# Patient Record
Sex: FEMALE | Race: WHITE | HISPANIC OR LATINO | Employment: FULL TIME
[De-identification: names, ages, dates, MRNs, and addresses within clinical notes are randomized per-mention and may not be internally consistent; named-entity substitution may affect disease eponyms.]

---

## 2017-01-19 ENCOUNTER — CHARTING TRANS (OUTPATIENT)
Dept: OTHER | Age: 37
End: 2017-01-19

## 2017-01-19 ENCOUNTER — LAB SERVICES (OUTPATIENT)
Dept: OTHER | Age: 37
End: 2017-01-19

## 2017-01-24 ENCOUNTER — CHARTING TRANS (OUTPATIENT)
Dept: OTHER | Age: 37
End: 2017-01-24

## 2017-01-24 LAB
C TRACH RRNA SPEC QL NAA+PROBE: NEGATIVE
CANDIDA RRNA VAG QL PROBE: NORMAL
G VAGINALIS RRNA GENITAL QL PROBE: NORMAL
HAV IGM SER QL: NEGATIVE
HBV CORE IGM SER QL: NEGATIVE
HBV SURFACE AG SER QL: NEGATIVE
HCV AB SER QL: NEGATIVE
HIV 1+2 AB+HIV1 P24 AG SERPL QL IA: NONREACTIVE
N GONORRHOEA RRNA SPEC QL NAA+PROBE: NEGATIVE
RPR SER QL: NONREACTIVE
SERVICE CMNT-IMP: NORMAL
SPECIMEN SOURCE: NORMAL
SPECIMEN SOURCE: NORMAL
T VAGINALIS RRNA GENITAL QL PROBE: NORMAL
T VAGINALIS RRNA SPEC QL NAA+PROBE: NEGATIVE

## 2017-02-19 LAB
ABSOLUTE BASOPHILS: 37 CELLS/UL (ref 0–200)
ABSOLUTE EOSINOPHILS: 33 CELLS/UL (ref 15–500)
ABSOLUTE LYMPHOCYTES: 1123 CELLS/UL (ref 850–3900)
ABSOLUTE MONOCYTES: 324 CELLS/UL (ref 200–950)
ABSOLUTE NEUTROPHILS: 2583 CELLS/UL (ref 1500–7800)
ALBUMIN/GLOBULIN RATIO: 1.4 (CALC) (ref 1–2.5)
ALBUMIN: 4.1 G/DL (ref 3.6–5.1)
ALKALINE PHOSPHATASE: 34 U/L (ref 33–115)
ALT: 9 U/L (ref 6–29)
AST: 12 U/L (ref 10–30)
BASOPHILS: 0.9 %
BILIRUBIN, TOTAL: 0.8 MG/DL (ref 0.2–1.2)
BUN: 8 MG/DL (ref 7–25)
CALCIUM: 9 MG/DL (ref 8.6–10.2)
CARBON DIOXIDE: 25 MMOL/L (ref 20–31)
CHLORIDE: 104 MMOL/L (ref 98–110)
CREATININE: 0.71 MG/DL (ref 0.5–1.1)
EGFR IF AFRICN AM: 126 ML/MIN/1.73M2
EGFR IF NONAFRICN AM: 109 ML/MIN/1.73M2
EOSINOPHILS: 0.8 %
GLOBULIN: 2.9 G/DL (CALC) (ref 1.9–3.7)
GLUCOSE: 77 MG/DL (ref 65–99)
HEMATOCRIT: 38.4 % (ref 35–45)
HEMOGLOBIN: 13.1 G/DL (ref 11.7–15.5)
LYMPHOCYTES: 27.4 %
MCH: 31.4 PG (ref 27–33)
MCHC: 34.1 G/DL (ref 32–36)
MCV: 92 FL (ref 80–100)
MONOCYTES: 7.9 %
MPV: 8.9 FL (ref 7.5–12.5)
NEUTROPHILS: 63 %
PLATELET COUNT: 257 THOUSAND/UL (ref 140–400)
POTASSIUM: 4.2 MMOL/L (ref 3.5–5.3)
PROTEIN, TOTAL: 7 G/DL (ref 6.1–8.1)
RDW: 13.2 % (ref 11–15)
RED BLOOD CELL COUNT: 4.17 MILLION/UL (ref 3.8–5.1)
SODIUM: 138 MMOL/L (ref 135–146)
WHITE BLOOD CELL COUNT: 4.1 THOUSAND/UL (ref 3.8–10.8)

## 2017-03-10 ENCOUNTER — TELEPHONE (OUTPATIENT)
Dept: NEUROLOGY | Facility: CLINIC | Age: 37
End: 2017-03-10

## 2017-03-10 NOTE — TELEPHONE ENCOUNTER
Notes Recorded by Kike Barahona MD on 2/22/2017 at 9:18 AM  Lab good    Relayed above, verbalized understanding. No further questions.

## 2017-05-30 ENCOUNTER — TELEPHONE (OUTPATIENT)
Dept: NEUROLOGY | Facility: CLINIC | Age: 37
End: 2017-05-30

## 2017-05-30 DIAGNOSIS — G35 MULTIPLE SCLEROSIS (HCC): Primary | ICD-10-CM

## 2017-06-01 ENCOUNTER — TELEPHONE (OUTPATIENT)
Dept: NEUROLOGY | Facility: CLINIC | Age: 37
End: 2017-06-01

## 2017-06-01 NOTE — TELEPHONE ENCOUNTER
Approval received from [facility] for Tecfidera     Approval #[id number] PN from 5/30/17 - 05/30/2019

## 2017-06-01 NOTE — TELEPHONE ENCOUNTER
Contacted [facility] pharmacy and notified them of the approval.  Spoke with Adonay Cooley, and she entered the approval into their system. She states that patient can call pharmacy directly if she needs to expedite processing of the Rx.     Spoke with nati

## 2017-06-23 ENCOUNTER — OFFICE VISIT (OUTPATIENT)
Dept: NEUROLOGY | Facility: CLINIC | Age: 37
End: 2017-06-23

## 2017-06-23 VITALS
WEIGHT: 169 LBS | SYSTOLIC BLOOD PRESSURE: 118 MMHG | DIASTOLIC BLOOD PRESSURE: 62 MMHG | RESPIRATION RATE: 18 BRPM | HEART RATE: 64 BPM | BODY MASS INDEX: 27 KG/M2

## 2017-06-23 DIAGNOSIS — R20.9 DISTURBANCE OF SKIN SENSATION: ICD-10-CM

## 2017-06-23 DIAGNOSIS — G35 MULTIPLE SCLEROSIS EXACERBATION (HCC): Primary | ICD-10-CM

## 2017-06-23 DIAGNOSIS — G35 MULTIPLE SCLEROSIS (HCC): ICD-10-CM

## 2017-06-23 PROCEDURE — 99214 OFFICE O/P EST MOD 30 MIN: CPT | Performed by: OTHER

## 2017-06-23 NOTE — PROGRESS NOTES
[facility] [place] [facility]  Neurology - Clinic Follow up  2017    Charo Jones Patient Status:  No patient class for patient encounter    1980 MRN [id number]   Location SYSTEMS:    General: denies any fever or chills. Respiratory: Denies: Difficulty Breathing, Chronic Cough and Wheezing. Cardiovascular: NO Chest Pain and Palpitations. Neurological:  See history; relevant items discussed in the history.   Psychiatric: (CPT=72156/98938)    (G35) Multiple sclerosis ([facility] [address]  Plan: CBC WITH DIFFERENTIAL WITH PLATELET, COMP         METABOLIC PANEL (14), STRATIFY JCV(TM) AB (WITH        INDEX) W/RFL INHIBITION    (R20.9) Disturbance of skin sensation        MS is stable,   cont

## 2017-06-23 NOTE — PATIENT INSTRUCTIONS
Refill policies:    • Allow 2-3 business days for refills; controlled substances may take longer.   • Contact your pharmacy at least 5 days prior to running out of medication and have them send an electronic request or submit request through the [facility] and the [facility] have a procedure or additional testing performed. [facility]) will contact your insurance carrier to obtain pre-certification or prior authorization.     Unfortunately, [facility] has seen an increase in denial of payment even though the p

## 2017-06-30 ENCOUNTER — TELEPHONE (OUTPATIENT)
Dept: SURGERY | Facility: CLINIC | Age: 37
End: 2017-06-30

## 2017-06-30 NOTE — TELEPHONE ENCOUNTER
Patient at [facility] for [address]. Only has order, does not have [facility] JCV form. Needing to have it faxed. Faxed form to number above. Confirmation received.

## 2017-07-01 LAB
ABSOLUTE BASOPHILS: 19 CELLS/UL (ref 0–200)
ABSOLUTE EOSINOPHILS: 70 CELLS/UL (ref 15–500)
ABSOLUTE LYMPHOCYTES: 1162 CELLS/UL (ref 850–3900)
ABSOLUTE MONOCYTES: 320 CELLS/UL (ref 200–950)
ABSOLUTE NEUTROPHILS: 1629 CELLS/UL (ref 1500–7800)
ALBUMIN/GLOBULIN RATIO: 1.4 (CALC) (ref 1–2.5)
ALBUMIN: 4 G/DL (ref 3.6–5.1)
ALKALINE PHOSPHATASE: 44 U/L (ref 33–115)
ALT: 13 U/L (ref 6–29)
AST: 13 U/L (ref 10–30)
BASOPHILS: 0.6 %
BILIRUBIN, TOTAL: 0.8 MG/DL (ref 0.2–1.2)
BUN: 8 MG/DL (ref 7–25)
CALCIUM: 9.1 MG/DL (ref 8.6–10.2)
CARBON DIOXIDE: 24 MMOL/L (ref 20–31)
CHLORIDE: 108 MMOL/L (ref 98–110)
CREATININE: 0.71 MG/DL (ref 0.5–1.1)
EGFR IF AFRICN AM: 126 ML/MIN/1.73M2
EGFR IF NONAFRICN AM: 109 ML/MIN/1.73M2
EOSINOPHILS: 2.2 %
GLOBULIN: 2.9 G/DL (CALC) (ref 1.9–3.7)
GLUCOSE: 81 MG/DL (ref 65–99)
HEMATOCRIT: 38.1 % (ref 35–45)
HEMOGLOBIN: 12.9 G/DL (ref 11.7–15.5)
LYMPHOCYTES: 36.3 %
MCH: 30.9 PG (ref 27–33)
MCHC: 33.9 G/DL (ref 32–36)
MCV: 91.4 FL (ref 80–100)
MONOCYTES: 10 %
MPV: 10.5 FL (ref 7.5–12.5)
NEUTROPHILS: 50.9 %
PLATELET COUNT: 253 THOUSAND/UL (ref 140–400)
POTASSIUM: 4.6 MMOL/L (ref 3.5–5.3)
PROTEIN, TOTAL: 6.9 G/DL (ref 6.1–8.1)
RDW: 12.2 % (ref 11–15)
RED BLOOD CELL COUNT: 4.17 MILLION/UL (ref 3.8–5.1)
SODIUM: 141 MMOL/L (ref 135–146)
WHITE BLOOD CELL COUNT: 3.2 THOUSAND/UL (ref 3.8–10.8)

## 2017-07-06 ENCOUNTER — TELEPHONE (OUTPATIENT)
Dept: SURGERY | Facility: CLINIC | Age: 37
End: 2017-07-06

## 2017-07-06 NOTE — TELEPHONE ENCOUNTER
----- Message from Jia Montilla MD sent at 7/6/2017  8:37 AM CDT -----  WBC is slightly low, acceptable.  Repeat CBC in a month

## 2017-07-10 ENCOUNTER — TELEPHONE (OUTPATIENT)
Dept: NEUROLOGY | Facility: CLINIC | Age: 37
End: 2017-07-10

## 2017-07-10 DIAGNOSIS — G35 MULTIPLE SCLEROSIS (HCC): Primary | ICD-10-CM

## 2017-07-10 NOTE — TELEPHONE ENCOUNTER
----- Message from Saul Fernandez MD sent at 7/10/2017  9:06 AM CDT -----  Tell her, she has positive JCV labs, she needs to change to [place],

## 2017-07-10 NOTE — TELEPHONE ENCOUNTER
Informed patient of test results and doctors recommendations. Educated patient on JCV and PML risk with Tecfidera use. Educated patient on Aubagio and the information packets available at the office.      Informed the patient she will need to come to the of

## 2017-07-11 NOTE — TELEPHONE ENCOUNTER
Fax from [phone] notifying us of approval of Aubagio from 7/10/17 through 7/10/2019. PA# [place] [employer] [id number] [id number]    Approval letter placed with Start Form in nurses bin.   Continue to wait for pt to come into office to sign start for

## 2017-07-12 NOTE — TELEPHONE ENCOUNTER
Paperwork signed. Placed in pending folder. Discussed side effects with patient. Need TB skin test and monthly CMP for first 6 months. Explained to patient to contact office if planning on having kids. Given Aubagio literature for patient's review. No ad

## 2017-07-20 ENCOUNTER — TELEPHONE (OUTPATIENT)
Dept: NEUROLOGY | Facility: CLINIC | Age: 37
End: 2017-07-20

## 2017-07-20 NOTE — TELEPHONE ENCOUNTER
MRI Brain and C-Spine reviewed and initialed by Dr. Leilani Cason. Both stable. Contacted patient and relayed results. She verbalized understanding.

## 2017-07-20 NOTE — TELEPHONE ENCOUNTER
Spoke with pt to remind her of outstanding TB test results. She indicated that she had the test done yesterday, and will go back tomorrow (Friday 7/21/17) to have it read.   Since the facility is outside of [facility], she will get results from them and fax to ou

## 2017-07-22 ENCOUNTER — TELEPHONE (OUTPATIENT)
Dept: NEUROLOGY | Facility: CLINIC | Age: 37
End: 2017-07-22

## 2017-07-25 NOTE — ADDENDUM NOTE
Encounter addended by: Maria Isabel Kumari RN on: 7/25/2017 12:47 PM<BR>    Actions taken: Contacts section saved, Chief Complaint modified, External results created, Immunization record saved, Immunization record deleted,  activity accessed, Rena

## 2017-07-25 NOTE — TELEPHONE ENCOUNTER
Rec'd incoming fax with TB Skin Test Results, which were negative. Test results sent to scanning. Completed Start Form faxed to kelli Stephenson  [employer] [place], along with facesheet, copy of insurance card, and authorization letter.   Fax confirmation re

## 2017-07-26 ENCOUNTER — TELEPHONE (OUTPATIENT)
Dept: NEUROLOGY | Facility: CLINIC | Age: 37
End: 2017-07-26

## 2017-07-26 NOTE — TELEPHONE ENCOUNTER
Patient informed TB results were received and paperwork was faxed yesterday and should be receiving a call from [employer]. Patient verbalizes understanding.

## 2017-07-28 NOTE — TELEPHONE ENCOUNTER
Received approval for Traceygideny from [place] [address] [employer].      PA approval #[id number], valid from 7- through 7-    Patient approved for Aubagio Co-pay assistance program. Eligibility date 7/27/12017- 7/26/2018        Left detailed message on marc

## 2017-08-31 ENCOUNTER — TELEPHONE (OUTPATIENT)
Dept: NEUROLOGY | Facility: CLINIC | Age: 37
End: 2017-08-31

## 2017-08-31 NOTE — TELEPHONE ENCOUNTER
Patient has been under a lot of stress at work since 5/17. Since that time she has worsening constant generalized \"aching\" , \"traveling\" pain to different areas of body, numbness in feet, cramping in feet, stabbing R>L arm and vertigo is more frequent.

## 2017-08-31 NOTE — TELEPHONE ENCOUNTER
Called her back, she is stressful in her job, has more HA and nausea and tingling, fatigue,   I feel some of HA and nausea is related to her aubagio, instructed her to take ASA qam, advil PRN,   We can provide a letter to her job, stating, due to increasin

## 2017-09-15 ENCOUNTER — TELEPHONE (OUTPATIENT)
Dept: NEUROLOGY | Facility: CLINIC | Age: 37
End: 2017-09-15

## 2017-09-15 NOTE — TELEPHONE ENCOUNTER
Left message on patient identified voicemail (ok with HIPPA consent) informing patient monthly ALT has been ordered. If she is going outside of [facility] to have done, to call us and let us know so we can forward order.

## 2017-10-17 NOTE — TELEPHONE ENCOUNTER
Left a message for patient to call back. When returns call please ask for a fax number so we can fax her lab orders to her.

## 2017-10-24 ENCOUNTER — TELEPHONE (OUTPATIENT)
Dept: NEUROLOGY | Facility: CLINIC | Age: 37
End: 2017-10-24

## 2017-10-24 NOTE — TELEPHONE ENCOUNTER
Patient informed of below and reminded to repeat lab in one month. Patient verbalizes understanding.

## 2017-11-27 ENCOUNTER — TELEPHONE (OUTPATIENT)
Dept: NEUROLOGY | Facility: CLINIC | Age: 37
End: 2017-11-27

## 2017-11-27 NOTE — TELEPHONE ENCOUNTER
----- Message from Silvio Yanes MD sent at 11/25/2017  2:06 PM CST -----  normal      Relayed above, informed pt to redraw in 1 month. Verbalized understanding.

## 2017-12-15 ENCOUNTER — OFFICE VISIT (OUTPATIENT)
Dept: NEUROLOGY | Facility: CLINIC | Age: 37
End: 2017-12-15

## 2017-12-15 VITALS
RESPIRATION RATE: 16 BRPM | HEART RATE: 86 BPM | BODY MASS INDEX: 29.41 KG/M2 | DIASTOLIC BLOOD PRESSURE: 76 MMHG | SYSTOLIC BLOOD PRESSURE: 112 MMHG | HEIGHT: 66 IN | WEIGHT: 183 LBS

## 2017-12-15 DIAGNOSIS — H81.10 BENIGN PAROXYSMAL POSITIONAL VERTIGO, UNSPECIFIED LATERALITY: ICD-10-CM

## 2017-12-15 DIAGNOSIS — M54.42 CHRONIC RIGHT-SIDED LOW BACK PAIN WITH BILATERAL SCIATICA: ICD-10-CM

## 2017-12-15 DIAGNOSIS — G89.29 CHRONIC RIGHT-SIDED LOW BACK PAIN WITH BILATERAL SCIATICA: ICD-10-CM

## 2017-12-15 DIAGNOSIS — G35 MULTIPLE SCLEROSIS (HCC): ICD-10-CM

## 2017-12-15 DIAGNOSIS — R20.9 DISTURBANCE OF SKIN SENSATION: ICD-10-CM

## 2017-12-15 DIAGNOSIS — G35 MULTIPLE SCLEROSIS EXACERBATION (HCC): Primary | ICD-10-CM

## 2017-12-15 DIAGNOSIS — M54.41 CHRONIC RIGHT-SIDED LOW BACK PAIN WITH BILATERAL SCIATICA: ICD-10-CM

## 2017-12-15 PROBLEM — R20.2 NUMBNESS AND TINGLING OF FOOT: Status: ACTIVE | Noted: 2017-12-15

## 2017-12-15 PROBLEM — R20.0 NUMBNESS AND TINGLING OF FOOT: Status: ACTIVE | Noted: 2017-12-15

## 2017-12-15 PROBLEM — M54.50 CHRONIC RIGHT-SIDED LOW BACK PAIN: Status: ACTIVE | Noted: 2017-12-15

## 2017-12-15 PROCEDURE — 99215 OFFICE O/P EST HI 40 MIN: CPT | Performed by: OTHER

## 2017-12-15 NOTE — PROGRESS NOTES
[facility] [place] [facility]  Neurology - Clinic Follow up  12/15/2017    Vanesa Steele Patient Status:  No patient class for patient encounter    1980 MRN [id number]   Location Prescriptions:  Teriflunomide (AUBAGIO) 14 MG Oral Tab Take 14 mg by mouth daily. Disp: 28 tablet Rfl: 12   Probiotic Product (PROBIOTIC DAILY OR) Take by mouth daily. Disp:  Rfl:    JOLESSA 0.15-0.03 MG Oral Tab 1 tablet daily.  Disp:  Rfl:    Cholecalcife COMP METABOLIC PANEL (14)    CBC, PLATELET; NO DIFFERENTIAL    FOLIC ACID SERUM(FOLATE)    ASSAY, THYROID STIM HORMONE    SED RATE, WESTERGREN (AUTOMATED)    VITAMIN B12    VITAMIN B6    VITAMIN E, SERUM    MARIA ELENA, DIRECT, REFLEX TO 9 ENAS    Multiple scleros

## 2017-12-15 NOTE — PATIENT INSTRUCTIONS
Refill policies:    • Allow 2-3 business days for refills; controlled substances may take longer.   • Contact your pharmacy at least 5 days prior to running out of medication and have them send an electronic request or submit request through the [facility] have a procedure or additional testing performed. [facility]) will contact your insurance carrier to obtain pre-certification or prior authorization.     Unfortunately, [facility] has seen an increase in denial of payment even though the p

## 2017-12-26 ENCOUNTER — TELEPHONE (OUTPATIENT)
Dept: NEUROLOGY | Facility: CLINIC | Age: 37
End: 2017-12-26

## 2017-12-27 ENCOUNTER — TELEPHONE (OUTPATIENT)
Dept: NEUROLOGY | Facility: CLINIC | Age: 37
End: 2017-12-27

## 2017-12-28 ENCOUNTER — TELEPHONE (OUTPATIENT)
Dept: NEUROLOGY | Facility: CLINIC | Age: 37
End: 2017-12-28

## 2017-12-28 NOTE — TELEPHONE ENCOUNTER
----- Message from Austin Pagan MD sent at 12/28/2017  2:26 PM CST -----  Her B6 and B12 very, she needs to do B12 injection weekly x4 then monthly, take B complex daily, injection can be done now without seeing me

## 2017-12-28 NOTE — TELEPHONE ENCOUNTER
Relayed test results and recommendations to patient. Patient verbalized understanding and has no questions or concerns at this time. Call transferred to [facility] to schedule B12 injections.

## 2017-12-28 NOTE — TELEPHONE ENCOUNTER
EMG appt has been rescheduled for 1/3/18. F/U appt still scheduled with Dr. Teresa Tobias for 1/16/18 at [address] [place] [address] for patient informing her of above. Encouraged her to c/b office with questions.

## 2018-01-03 ENCOUNTER — OFFICE VISIT (OUTPATIENT)
Dept: ELECTROPHYSIOLOGY | Facility: HOSPITAL | Age: 38
End: 2018-01-03
Attending: Other
Payer: COMMERCIAL

## 2018-01-03 ENCOUNTER — CHARTING TRANS (OUTPATIENT)
Dept: OTHER | Age: 38
End: 2018-01-03

## 2018-01-03 ENCOUNTER — NURSE ONLY (OUTPATIENT)
Dept: NEUROLOGY | Facility: CLINIC | Age: 38
End: 2018-01-03

## 2018-01-03 ENCOUNTER — LAB SERVICES (OUTPATIENT)
Dept: OTHER | Age: 38
End: 2018-01-03

## 2018-01-03 DIAGNOSIS — G35 MULTIPLE SCLEROSIS EXACERBATION (HCC): ICD-10-CM

## 2018-01-03 DIAGNOSIS — E53.8 VITAMIN B12 DEFICIENCY: Primary | ICD-10-CM

## 2018-01-03 DIAGNOSIS — R20.9 DISTURBANCE OF SKIN SENSATION: ICD-10-CM

## 2018-01-03 PROCEDURE — 96372 THER/PROPH/DIAG INJ SC/IM: CPT | Performed by: OTHER

## 2018-01-03 PROCEDURE — 95886 MUSC TEST DONE W/N TEST COMP: CPT | Performed by: OTHER

## 2018-01-03 PROCEDURE — 95913 NRV CNDJ TEST 13/> STUDIES: CPT | Performed by: OTHER

## 2018-01-03 RX ORDER — CYANOCOBALAMIN 1000 UG/ML
1000 INJECTION INTRAMUSCULAR; SUBCUTANEOUS ONCE
Status: COMPLETED | OUTPATIENT
Start: 2018-01-03 | End: 2018-01-03

## 2018-01-03 RX ADMIN — CYANOCOBALAMIN 1000 MCG: 1000 INJECTION INTRAMUSCULAR; SUBCUTANEOUS at 11:37:00

## 2018-01-03 NOTE — PATIENT INSTRUCTIONS
Refill policies:    • Allow 2-3 business days for refills; controlled substances may take longer.   • Contact your pharmacy at least 5 days prior to running out of medication and have them send an electronic request or submit request through the [facility] have a procedure or additional testing performed. [facility]) will contact your insurance carrier to obtain pre-certification or prior authorization.     Unfortunately, [facility] has seen an increase in denial of payment even though the p

## 2018-01-03 NOTE — PROCEDURES
[address]      PATIENT'S NAME: Guillaume Harvey   REFERRING PHYSICIAN: Saul Fernandez M.D.    PATIENT ACCOUNT #: [de-identified] LOCATION: [facility]   MEDICAL RECORD #: [id number] YOB: 1980   D this time.        Dictated By Karen Ireland M.D.  d:   01/03/2018 10:55:00  t:   01/03/2018 10:57:57  Job  [id number]  YK/

## 2018-01-04 ENCOUNTER — CHARTING TRANS (OUTPATIENT)
Dept: OTHER | Age: 38
End: 2018-01-04

## 2018-01-04 LAB — RPR SER QL: NONREACTIVE

## 2018-01-05 ENCOUNTER — CHARTING TRANS (OUTPATIENT)
Dept: OTHER | Age: 38
End: 2018-01-05

## 2018-01-05 LAB
C TRACH RRNA SPEC QL NAA+PROBE: NEGATIVE
HAV IGM SER QL: NEGATIVE
HBV CORE IGM SER QL: NEGATIVE
HBV SURFACE AG SER QL: NEGATIVE
HCV AB SER QL: NEGATIVE
HIV 1+2 AB+HIV1 P24 AG SERPL QL IA: NONREACTIVE
N GONORRHOEA RRNA SPEC QL NAA+PROBE: NEGATIVE
SPECIMEN SOURCE: NORMAL

## 2018-01-09 ENCOUNTER — CHARTING TRANS (OUTPATIENT)
Dept: OTHER | Age: 38
End: 2018-01-09

## 2018-01-10 ENCOUNTER — NURSE ONLY (OUTPATIENT)
Dept: NEUROLOGY | Facility: CLINIC | Age: 38
End: 2018-01-10

## 2018-01-10 DIAGNOSIS — E53.8 VITAMIN B 12 DEFICIENCY: Primary | ICD-10-CM

## 2018-01-10 LAB — PAP WITH HIGH RISK HPV: NORMAL

## 2018-01-10 PROCEDURE — 96372 THER/PROPH/DIAG INJ SC/IM: CPT | Performed by: OTHER

## 2018-01-10 RX ORDER — CYANOCOBALAMIN 1000 UG/ML
1000 INJECTION INTRAMUSCULAR; SUBCUTANEOUS ONCE
Status: COMPLETED | OUTPATIENT
Start: 2018-01-10 | End: 2018-01-10

## 2018-01-10 RX ADMIN — CYANOCOBALAMIN 1000 MCG: 1000 INJECTION INTRAMUSCULAR; SUBCUTANEOUS at 10:20:00

## 2018-01-16 ENCOUNTER — OFFICE VISIT (OUTPATIENT)
Dept: NEUROLOGY | Facility: CLINIC | Age: 38
End: 2018-01-16

## 2018-01-16 ENCOUNTER — TELEPHONE (OUTPATIENT)
Dept: NEUROLOGY | Facility: CLINIC | Age: 38
End: 2018-01-16

## 2018-01-16 ENCOUNTER — NURSE ONLY (OUTPATIENT)
Dept: NEUROLOGY | Facility: CLINIC | Age: 38
End: 2018-01-16

## 2018-01-16 VITALS — HEART RATE: 58 BPM | RESPIRATION RATE: 14 BRPM | SYSTOLIC BLOOD PRESSURE: 98 MMHG | DIASTOLIC BLOOD PRESSURE: 62 MMHG

## 2018-01-16 DIAGNOSIS — R20.9 DISTURBANCE OF SKIN SENSATION: ICD-10-CM

## 2018-01-16 DIAGNOSIS — G35 MULTIPLE SCLEROSIS EXACERBATION (HCC): Primary | ICD-10-CM

## 2018-01-16 DIAGNOSIS — G62.9 SENSORY NEUROPATHY: ICD-10-CM

## 2018-01-16 DIAGNOSIS — G35 MULTIPLE SCLEROSIS (HCC): ICD-10-CM

## 2018-01-16 DIAGNOSIS — G35 MULTIPLE SCLEROSIS (HCC): Primary | ICD-10-CM

## 2018-01-16 DIAGNOSIS — H81.13 BENIGN PAROXYSMAL POSITIONAL VERTIGO DUE TO BILATERAL VESTIBULAR DISORDER: ICD-10-CM

## 2018-01-16 DIAGNOSIS — E53.8 VITAMIN B12 DEFICIENCY: Primary | ICD-10-CM

## 2018-01-16 DIAGNOSIS — R20.2 NUMBNESS AND TINGLING OF FOOT: ICD-10-CM

## 2018-01-16 DIAGNOSIS — E53.8 VITAMIN B 12 DEFICIENCY: ICD-10-CM

## 2018-01-16 DIAGNOSIS — R20.0 NUMBNESS AND TINGLING OF FOOT: ICD-10-CM

## 2018-01-16 DIAGNOSIS — R53.82 CHRONIC FATIGUE: ICD-10-CM

## 2018-01-16 PROCEDURE — 99214 OFFICE O/P EST MOD 30 MIN: CPT | Performed by: OTHER

## 2018-01-16 PROCEDURE — 96372 THER/PROPH/DIAG INJ SC/IM: CPT | Performed by: OTHER

## 2018-01-16 RX ORDER — CYANOCOBALAMIN 1000 UG/ML
1000 INJECTION INTRAMUSCULAR; SUBCUTANEOUS ONCE
Status: COMPLETED | OUTPATIENT
Start: 2018-01-16 | End: 2018-01-16

## 2018-01-16 RX ADMIN — CYANOCOBALAMIN 1000 MCG: 1000 INJECTION INTRAMUSCULAR; SUBCUTANEOUS at 10:24:00

## 2018-01-16 NOTE — TELEPHONE ENCOUNTER
Rec'd incoming fax from [place] [phone] requesting PA be completed for Aubagio. Pt in office today and recent labs were reviewed and WNL. Pt to continue with Aubagio. Referral initiated.

## 2018-01-16 NOTE — PATIENT INSTRUCTIONS
Refill policies:    • Allow 2-3 business days for refills; controlled substances may take longer.   • Contact your pharmacy at least 5 days prior to running out of medication and have them send an electronic request or submit request through the [facility] recommended that you have a procedure or additional testing performed. [facility]) will contact your insurance carrier to obtain pre-certification or prior authorization.     Unfortunately, [facility] has seen an increase in denial of paym

## 2018-01-16 NOTE — PROGRESS NOTES
Patient states sees a decrease in number and time of dizzy spells but numbness and tingling has not changed

## 2018-01-16 NOTE — PROGRESS NOTES
[facility] in [place] with [facility]  Neurology - Clinic Follow up  2018    Thomas Knapp Patient Status:  No patient class for patient encounter    1980 MRN [id number]   Location Breathing, Chronic Cough and Wheezing. Cardiovascular: NO Chest Pain and Palpitations. Neurological:  See history; relevant items discussed in the history.   Psychiatric: no depression, no suicidal attempt,   Musculoskeletal:  NO current complaint,  All o (primary encounter diagnosis)    (G35) Multiple sclerosis ([facility] [address]  Plan: CBC WITH DIFFERENTIAL WITH PLATELET, COMP         METABOLIC PANEL (14)    (G06.39) Benign paroxysmal positional vertigo due to bilateral vestibular disorder    (R20.9) Disturbance of sk

## 2018-01-16 NOTE — PATIENT INSTRUCTIONS
Refill policies:    • Allow 2-3 business days for refills; controlled substances may take longer.   • Contact your pharmacy at least 5 days prior to running out of medication and have them send an electronic request or submit request through the [facility] recommended that you have a procedure or additional testing performed. [facility]) will contact your insurance carrier to obtain pre-certification or prior authorization.     Unfortunately, [facility] has seen an increase in denial of paym

## 2018-01-16 NOTE — PROGRESS NOTES
Nurse visit for Vit. B12 injection. No complaints or concerns since last visit. Injection given, left upper arm. Patient tolerated well.

## 2018-01-21 LAB
ABSOLUTE BASOPHILS: 42 CELLS/UL (ref 0–200)
ABSOLUTE EOSINOPHILS: 140 CELLS/UL (ref 15–500)
ABSOLUTE LYMPHOCYTES: 1160 CELLS/UL (ref 850–3900)
ABSOLUTE MONOCYTES: 645 CELLS/UL (ref 200–950)
ABSOLUTE NEUTROPHILS: 3214 CELLS/UL (ref 1500–7800)
ALBUMIN/GLOBULIN RATIO: 1.2 (CALC) (ref 1–2.5)
ALBUMIN: 3.3 G/DL (ref 3.6–5.1)
ALKALINE PHOSPHATASE: 51 U/L (ref 33–115)
ALT: 9 U/L (ref 6–29)
AST: 13 U/L (ref 10–30)
BASOPHILS: 0.8 %
BILIRUBIN, TOTAL: 0.7 MG/DL (ref 0.2–1.2)
BUN: 10 MG/DL (ref 7–25)
CALCIUM: 8.4 MG/DL (ref 8.6–10.2)
CARBON DIOXIDE: 22 MMOL/L (ref 20–31)
CHLORIDE: 108 MMOL/L (ref 98–110)
CREATININE: 0.69 MG/DL (ref 0.5–1.1)
EGFR IF AFRICN AM: 128 ML/MIN/1.73M2
EGFR IF NONAFRICN AM: 110 ML/MIN/1.73M2
EOSINOPHILS: 2.7 %
GLOBULIN: 2.8 G/DL (CALC) (ref 1.9–3.7)
GLUCOSE: 82 MG/DL (ref 65–99)
HEMATOCRIT: 35.2 % (ref 35–45)
HEMOGLOBIN: 11.8 G/DL (ref 11.7–15.5)
LYMPHOCYTES: 22.3 %
MCH: 30.2 PG (ref 27–33)
MCHC: 33.5 G/DL (ref 32–36)
MCV: 90 FL (ref 80–100)
MONOCYTES: 12.4 %
MPV: 10.8 FL (ref 7.5–12.5)
NEUTROPHILS: 61.8 %
PLATELET COUNT: 267 THOUSAND/UL (ref 140–400)
POTASSIUM: 4.2 MMOL/L (ref 3.5–5.3)
PROTEIN, TOTAL: 6.1 G/DL (ref 6.1–8.1)
RDW: 11.8 % (ref 11–15)
RED BLOOD CELL COUNT: 3.91 MILLION/UL (ref 3.8–5.1)
SODIUM: 137 MMOL/L (ref 135–146)
WHITE BLOOD CELL COUNT: 5.2 THOUSAND/UL (ref 3.8–10.8)

## 2018-01-24 ENCOUNTER — NURSE ONLY (OUTPATIENT)
Dept: NEUROLOGY | Facility: CLINIC | Age: 38
End: 2018-01-24

## 2018-01-24 DIAGNOSIS — E53.8 VITAMIN B12 DEFICIENCY: Primary | ICD-10-CM

## 2018-01-24 PROCEDURE — 96372 THER/PROPH/DIAG INJ SC/IM: CPT | Performed by: OTHER

## 2018-01-24 RX ORDER — CYANOCOBALAMIN 1000 UG/ML
1000 INJECTION INTRAMUSCULAR; SUBCUTANEOUS ONCE
Status: COMPLETED | OUTPATIENT
Start: 2018-01-24 | End: 2018-01-24

## 2018-01-24 RX ADMIN — CYANOCOBALAMIN 1000 MCG: 1000 INJECTION INTRAMUSCULAR; SUBCUTANEOUS at 09:27:00

## 2018-01-24 NOTE — PROCEDURES
Nurse visit for Vit. B12 injection. No complaints or concerns since last visit. Injection given, right upper arm. Patient tolerated well.

## 2018-01-24 NOTE — PATIENT INSTRUCTIONS
Refill policies:    • Allow 2-3 business days for refills; controlled substances may take longer.   • Contact your pharmacy at least 5 days prior to running out of medication and have them send an electronic request or submit request through the [facility] recommended that you have a procedure or additional testing performed. [facility]) will contact your insurance carrier to obtain pre-certification or prior authorization.     Unfortunately, [facility] has seen an increase in denial of paym

## 2018-01-25 NOTE — TELEPHONE ENCOUNTER
According to CoverMyMeds, outcome is N/A and informed a determination fax will be sent in 1 to 5 business days. Spoke to Will, [employer], states once it is reviewed by pharmacist, a determination letter will be faxed to the office.

## 2018-01-26 NOTE — TELEPHONE ENCOUNTER
Received Aubagio approval valid 1/25/18-1/25/20. RX was sent 12/15/17 + 12 additional. Left message on patient identified voicemail (ok with HIPPA consent) informing patient of approval and to contact pharmacy for delivery.

## 2018-01-30 ENCOUNTER — TELEPHONE (OUTPATIENT)
Dept: NEUROLOGY | Facility: CLINIC | Age: 38
End: 2018-01-30

## 2018-01-30 NOTE — TELEPHONE ENCOUNTER
----- Message from Alec Luque MD sent at 1/29/2018  1:11 PM CST -----  Labs fine, slightly low calcium, take calcium with D supplement daily

## 2018-02-09 NOTE — TELEPHONE ENCOUNTER
Received fax from [facility] [address] [employer] that they require a PA with patient's new insurance. [facility] confirms that the current PA is valid. Left message at [employer] that another PA does not appear to be needed.

## 2018-02-21 ENCOUNTER — CHARTING TRANS (OUTPATIENT)
Dept: OTHER | Age: 38
End: 2018-02-21

## 2018-02-23 ENCOUNTER — NURSE ONLY (OUTPATIENT)
Dept: NEUROLOGY | Facility: CLINIC | Age: 38
End: 2018-02-23

## 2018-02-23 DIAGNOSIS — E53.8 VITAMIN B 12 DEFICIENCY: Primary | ICD-10-CM

## 2018-02-23 PROCEDURE — 96372 THER/PROPH/DIAG INJ SC/IM: CPT | Performed by: OTHER

## 2018-02-23 RX ORDER — CYANOCOBALAMIN 1000 UG/ML
1000 INJECTION INTRAMUSCULAR; SUBCUTANEOUS ONCE
Status: COMPLETED | OUTPATIENT
Start: 2018-02-23 | End: 2018-02-23

## 2018-02-23 RX ADMIN — CYANOCOBALAMIN 1000 MCG: 1000 INJECTION INTRAMUSCULAR; SUBCUTANEOUS at 09:22:00

## 2018-03-23 ENCOUNTER — NURSE ONLY (OUTPATIENT)
Dept: NEUROLOGY | Facility: CLINIC | Age: 38
End: 2018-03-23

## 2018-03-23 DIAGNOSIS — E53.8 VITAMIN B 12 DEFICIENCY: Primary | ICD-10-CM

## 2018-03-23 PROCEDURE — 96372 THER/PROPH/DIAG INJ SC/IM: CPT | Performed by: OTHER

## 2018-03-23 RX ORDER — CYANOCOBALAMIN 1000 UG/ML
1000 INJECTION INTRAMUSCULAR; SUBCUTANEOUS ONCE
Status: COMPLETED | OUTPATIENT
Start: 2018-03-23 | End: 2018-03-23

## 2018-03-23 RX ADMIN — CYANOCOBALAMIN 1000 MCG: 1000 INJECTION INTRAMUSCULAR; SUBCUTANEOUS at 16:04:00

## 2018-03-23 NOTE — PATIENT INSTRUCTIONS
Refill policies:    • Allow 2-3 business days for refills; controlled substances may take longer.   • Contact your pharmacy at least 5 days prior to running out of medication and have them send an electronic request or submit request through the [facility] for the entire amount billed. Precertification and Prior Authorizations  If your physician has recommended that you have a procedure or additional testing performed.   [facility]) will contact your insurance carrier to obtain pr

## 2018-03-29 ENCOUNTER — TELEPHONE (OUTPATIENT)
Dept: NEUROLOGY | Facility: CLINIC | Age: 38
End: 2018-03-29

## 2018-03-29 DIAGNOSIS — G35 MULTIPLE SCLEROSIS (HCC): Primary | ICD-10-CM

## 2018-04-16 ENCOUNTER — TELEPHONE (OUTPATIENT)
Dept: NEUROLOGY | Facility: CLINIC | Age: 38
End: 2018-04-16

## 2018-04-16 NOTE — TELEPHONE ENCOUNTER
Spoke with patient and relayed CMP results from Dr. Cassius William below. Pt verbalized understanding, agrees to plan and expresses intent to comply with advice given.   Answered all questions and pt was encouraged to call office with any additional questions or co

## 2018-04-20 ENCOUNTER — NURSE ONLY (OUTPATIENT)
Dept: NEUROLOGY | Facility: CLINIC | Age: 38
End: 2018-04-20

## 2018-04-20 DIAGNOSIS — E53.8 VITAMIN B 12 DEFICIENCY: Primary | ICD-10-CM

## 2018-04-20 PROCEDURE — 96372 THER/PROPH/DIAG INJ SC/IM: CPT | Performed by: OTHER

## 2018-04-20 RX ORDER — CYANOCOBALAMIN 1000 UG/ML
1000 INJECTION INTRAMUSCULAR; SUBCUTANEOUS ONCE
Status: COMPLETED | OUTPATIENT
Start: 2018-04-20 | End: 2018-04-20

## 2018-04-20 RX ADMIN — CYANOCOBALAMIN 1000 MCG: 1000 INJECTION INTRAMUSCULAR; SUBCUTANEOUS at 09:10:00

## 2018-05-25 ENCOUNTER — NURSE ONLY (OUTPATIENT)
Dept: NEUROLOGY | Facility: CLINIC | Age: 38
End: 2018-05-25

## 2018-05-25 DIAGNOSIS — E53.8 VITAMIN B 12 DEFICIENCY: Primary | ICD-10-CM

## 2018-05-25 PROCEDURE — 96372 THER/PROPH/DIAG INJ SC/IM: CPT | Performed by: OTHER

## 2018-05-25 RX ORDER — CYANOCOBALAMIN 1000 UG/ML
1000 INJECTION INTRAMUSCULAR; SUBCUTANEOUS ONCE
Status: COMPLETED | OUTPATIENT
Start: 2018-05-25 | End: 2018-05-25

## 2018-05-25 RX ADMIN — CYANOCOBALAMIN 1000 MCG: 1000 INJECTION INTRAMUSCULAR; SUBCUTANEOUS at 09:12:00

## 2018-05-25 NOTE — PATIENT INSTRUCTIONS
Refill policies:    • Allow 2-3 business days for refills; controlled substances may take longer.   • Contact your pharmacy at least 5 days prior to running out of medication and have them send an electronic request or submit request through the “request re entire amount billed. Precertification and Prior Authorizations: If your physician has recommended that you have a procedure or additional testing performed.   [facility]) will contact your insurance carrier to obtain pre-certi

## 2018-06-05 ENCOUNTER — OFFICE VISIT (OUTPATIENT)
Dept: NEUROLOGY | Facility: CLINIC | Age: 38
End: 2018-06-05

## 2018-06-05 ENCOUNTER — TELEPHONE (OUTPATIENT)
Dept: NEUROLOGY | Facility: CLINIC | Age: 38
End: 2018-06-05

## 2018-06-05 VITALS
WEIGHT: 201 LBS | RESPIRATION RATE: 16 BRPM | SYSTOLIC BLOOD PRESSURE: 110 MMHG | HEART RATE: 72 BPM | BODY MASS INDEX: 32 KG/M2 | DIASTOLIC BLOOD PRESSURE: 78 MMHG

## 2018-06-05 DIAGNOSIS — R20.2 NUMBNESS AND TINGLING OF FOOT: ICD-10-CM

## 2018-06-05 DIAGNOSIS — R53.82 CHRONIC FATIGUE: ICD-10-CM

## 2018-06-05 DIAGNOSIS — E53.8 VITAMIN B 12 DEFICIENCY: ICD-10-CM

## 2018-06-05 DIAGNOSIS — M25.559 PAIN IN JOINT INVOLVING PELVIC REGION AND THIGH, UNSPECIFIED LATERALITY: Primary | ICD-10-CM

## 2018-06-05 DIAGNOSIS — G35 MULTIPLE SCLEROSIS EXACERBATION (HCC): ICD-10-CM

## 2018-06-05 DIAGNOSIS — R20.0 NUMBNESS AND TINGLING OF FOOT: ICD-10-CM

## 2018-06-05 DIAGNOSIS — G62.9 SENSORY NEUROPATHY: ICD-10-CM

## 2018-06-05 DIAGNOSIS — H81.10 BENIGN PAROXYSMAL POSITIONAL VERTIGO, UNSPECIFIED LATERALITY: ICD-10-CM

## 2018-06-05 DIAGNOSIS — G62.9 SENSORY NEUROPATHY: Primary | ICD-10-CM

## 2018-06-05 PROCEDURE — 99214 OFFICE O/P EST MOD 30 MIN: CPT | Performed by: OTHER

## 2018-06-05 RX ORDER — ASPIRIN 325 MG
325 TABLET ORAL DAILY
COMMUNITY
End: 2018-06-05

## 2018-06-05 RX ORDER — GABAPENTIN 100 MG/1
100 CAPSULE ORAL 3 TIMES DAILY
Qty: 90 CAPSULE | Refills: 5 | Status: SHIPPED | OUTPATIENT
Start: 2018-06-05 | End: 2018-06-05

## 2018-06-05 RX ORDER — CYANOCOBALAMIN 1000 UG/ML
1000 INJECTION INTRAMUSCULAR; SUBCUTANEOUS
COMMUNITY
End: 2020-08-03

## 2018-06-05 RX ORDER — GABAPENTIN 100 MG/1
100 CAPSULE ORAL 3 TIMES DAILY
Qty: 90 CAPSULE | Refills: 5 | Status: SHIPPED | OUTPATIENT
Start: 2018-06-05 | End: 2019-03-08

## 2018-06-05 NOTE — TELEPHONE ENCOUNTER
Spoke with patient. She is requesting Rx for Gabapentin to be sent to [facility]. She will call [facility] mail order pharmacy to cancel script. Noted Gabapentin 100 mg caps prescribed at office visit today. Resent Rx to [facility].  Patient notif

## 2018-06-05 NOTE — PROGRESS NOTES
[facility] in [place] with [facility]  Neurology - Clinic Follow up  2018    Suly Barboza Patient Status:  No patient class for patient encounter    1980 MRN [id number]   Location tablet by mouth daily. Disp:  Rfl:    gabapentin 100 MG Oral Cap Take 1 capsule (100 mg total) by mouth 3 (three) times daily. Disp: 90 capsule Rfl: 5   Teriflunomide (AUBAGIO) 14 MG Oral Tab Take 14 mg by mouth daily.  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

## 2018-06-05 NOTE — PROGRESS NOTES
Pt here for MS follow up. Pt reports her neuropathy has been getting worse. It started with a heat in her feet, and now legs are numb and tingling. She also reports restless legs. Pt has received 4 of 6 monthly B12 injections.

## 2018-06-05 NOTE — PATIENT INSTRUCTIONS
Refill policies:    • Allow 2-3 business days for refills; controlled substances may take longer.   • Contact your pharmacy at least 5 days prior to running out of medication and have them send an electronic request or submit request through the “request re entire amount billed. Precertification and Prior Authorizations: If your physician has recommended that you have a procedure or additional testing performed.   [facility]) will contact your insurance carrier to obtain pre-certi

## 2018-06-22 ENCOUNTER — NURSE ONLY (OUTPATIENT)
Dept: NEUROLOGY | Facility: CLINIC | Age: 38
End: 2018-06-22

## 2018-06-22 DIAGNOSIS — E53.8 VITAMIN B 12 DEFICIENCY: Primary | ICD-10-CM

## 2018-06-22 PROCEDURE — 96372 THER/PROPH/DIAG INJ SC/IM: CPT | Performed by: OTHER

## 2018-06-22 RX ORDER — CYANOCOBALAMIN 1000 UG/ML
1000 INJECTION INTRAMUSCULAR; SUBCUTANEOUS ONCE
Status: COMPLETED | OUTPATIENT
Start: 2018-06-22 | End: 2018-06-22

## 2018-06-22 RX ADMIN — CYANOCOBALAMIN 1000 MCG: 1000 INJECTION INTRAMUSCULAR; SUBCUTANEOUS at 09:08:00

## 2018-06-22 NOTE — PATIENT INSTRUCTIONS
Refill policies:    • Allow 2-3 business days for refills; controlled substances may take longer.   • Contact your pharmacy at least 5 days prior to running out of medication and have them send an electronic request or submit request through the “request re entire amount billed. Precertification and Prior Authorizations: If your physician has recommended that you have a procedure or additional testing performed.   [facility]) will contact your insurance carrier to obtain pre-certi

## 2018-06-25 ENCOUNTER — TELEPHONE (OUTPATIENT)
Dept: NEUROLOGY | Facility: CLINIC | Age: 38
End: 2018-06-25

## 2018-06-25 DIAGNOSIS — R20.2 NUMBNESS AND TINGLING OF FOOT: ICD-10-CM

## 2018-06-25 DIAGNOSIS — E53.8 VITAMIN B 12 DEFICIENCY: ICD-10-CM

## 2018-06-25 DIAGNOSIS — R20.0 NUMBNESS AND TINGLING OF FOOT: ICD-10-CM

## 2018-06-25 DIAGNOSIS — G35 MULTIPLE SCLEROSIS (HCC): Primary | ICD-10-CM

## 2018-06-25 NOTE — TELEPHONE ENCOUNTER
----- Message from Larissa Gurrola MD sent at 6/25/2018  9:54 AM CDT -----  Labs fine    Noted Vit B12 and B6 labs not completed     Patient notified of results above.    Patient requests lab orders for Vit B12 and B6 to be changed to [facility] and fax to her secure

## 2018-07-20 ENCOUNTER — NURSE ONLY (OUTPATIENT)
Dept: NEUROLOGY | Facility: CLINIC | Age: 38
End: 2018-07-20
Payer: COMMERCIAL

## 2018-07-20 DIAGNOSIS — E53.8 VITAMIN B 12 DEFICIENCY: Primary | ICD-10-CM

## 2018-07-20 PROCEDURE — 96372 THER/PROPH/DIAG INJ SC/IM: CPT | Performed by: OTHER

## 2018-07-20 RX ORDER — CYANOCOBALAMIN 1000 UG/ML
1000 INJECTION INTRAMUSCULAR; SUBCUTANEOUS ONCE
Status: COMPLETED | OUTPATIENT
Start: 2018-07-20 | End: 2018-07-20

## 2018-07-20 RX ADMIN — CYANOCOBALAMIN 1000 MCG: 1000 INJECTION INTRAMUSCULAR; SUBCUTANEOUS at 09:49:00

## 2018-07-20 NOTE — PATIENT INSTRUCTIONS
Refill policies:    • Allow 2-3 business days for refills; controlled substances may take longer.   • Contact your pharmacy at least 5 days prior to running out of medication and have them send an electronic request or submit request through the “request re entire amount billed. Precertification and Prior Authorizations: If your physician has recommended that you have a procedure or additional testing performed.   [facility]) will contact your insurance carrier to obtain pre-certi

## 2018-07-30 ENCOUNTER — TELEPHONE (OUTPATIENT)
Dept: NEUROLOGY | Facility: CLINIC | Age: 38
End: 2018-07-30

## 2018-08-14 ENCOUNTER — TELEPHONE (OUTPATIENT)
Dept: NEUROLOGY | Facility: CLINIC | Age: 38
End: 2018-08-14

## 2018-08-16 NOTE — TELEPHONE ENCOUNTER
Left message for patient to give us a call back. Please give patient results.          Per Dr. Jovani Mina- MRI Brain and MRI Thoracic is normal /stable  Reports sent to scanning

## 2018-08-17 NOTE — TELEPHONE ENCOUNTER
Patient notified of test results below. Verbalized understanding. She will bring Imaging CD's at f/u appt 9/6/18.

## 2018-09-05 ENCOUNTER — TELEPHONE (OUTPATIENT)
Dept: NEUROLOGY | Facility: CLINIC | Age: 38
End: 2018-09-05

## 2018-09-05 LAB
VITAMIN B12: 290 PG/ML (ref 200–1100)
VITAMIN B6: 2.7 NG/ML (ref 2.1–21.7)

## 2018-09-05 NOTE — TELEPHONE ENCOUNTER
Spoke with patient and relayed lab results from Dr. Natanael Mendosa below. Pt verbalized understanding, agrees to plan and expresses intent to comply with advice given.   Answered all questions and pt was encouraged to call office with any additional questions or co

## 2018-09-06 ENCOUNTER — OFFICE VISIT (OUTPATIENT)
Dept: NEUROLOGY | Facility: CLINIC | Age: 38
End: 2018-09-06
Payer: COMMERCIAL

## 2018-09-06 ENCOUNTER — TELEPHONE (OUTPATIENT)
Dept: NEUROLOGY | Facility: CLINIC | Age: 38
End: 2018-09-06

## 2018-09-06 VITALS
DIASTOLIC BLOOD PRESSURE: 74 MMHG | HEART RATE: 90 BPM | WEIGHT: 209 LBS | HEIGHT: 66 IN | RESPIRATION RATE: 16 BRPM | BODY MASS INDEX: 33.59 KG/M2 | SYSTOLIC BLOOD PRESSURE: 116 MMHG

## 2018-09-06 DIAGNOSIS — E53.8 VITAMIN B 12 DEFICIENCY: Primary | ICD-10-CM

## 2018-09-06 DIAGNOSIS — R53.82 CHRONIC FATIGUE: ICD-10-CM

## 2018-09-06 DIAGNOSIS — R20.0 NUMBNESS AND TINGLING OF FOOT: ICD-10-CM

## 2018-09-06 DIAGNOSIS — R20.9 DISTURBANCE OF SKIN SENSATION: ICD-10-CM

## 2018-09-06 DIAGNOSIS — R20.2 NUMBNESS AND TINGLING OF FOOT: ICD-10-CM

## 2018-09-06 DIAGNOSIS — G62.9 SENSORY NEUROPATHY: ICD-10-CM

## 2018-09-06 DIAGNOSIS — F41.9 ANXIETY: ICD-10-CM

## 2018-09-06 DIAGNOSIS — G35 MULTIPLE SCLEROSIS (HCC): ICD-10-CM

## 2018-09-06 PROCEDURE — 99214 OFFICE O/P EST MOD 30 MIN: CPT | Performed by: OTHER

## 2018-09-06 PROCEDURE — 96372 THER/PROPH/DIAG INJ SC/IM: CPT | Performed by: OTHER

## 2018-09-06 RX ORDER — CYANOCOBALAMIN 1000 UG/ML
1000 INJECTION INTRAMUSCULAR; SUBCUTANEOUS ONCE
Status: COMPLETED | OUTPATIENT
Start: 2018-09-06 | End: 2018-09-06

## 2018-09-06 RX ORDER — ESCITALOPRAM OXALATE 10 MG/1
10 TABLET ORAL DAILY
Qty: 30 TABLET | Refills: 11 | Status: SHIPPED | OUTPATIENT
Start: 2018-09-06 | End: 2019-10-22

## 2018-09-06 RX ADMIN — CYANOCOBALAMIN 1000 MCG: 1000 INJECTION INTRAMUSCULAR; SUBCUTANEOUS at 12:19:00

## 2018-09-06 NOTE — PROGRESS NOTES
Patient is here for MS and neuropathy of her feet. Patient is starting to feel numbness in her feet for the past couple of days.

## 2018-09-06 NOTE — PROGRESS NOTES
Pt here for first of six monthly injections. Consent form signed. B12 IM injection administered per MAR. Pt tolerated well.

## 2018-09-06 NOTE — PROGRESS NOTES
[facility] [place] [facility]  Neurology - Clinic Follow up  2018    Muriel Polanco Patient Status:  No patient class for patient encounter    1980 MRN [id number]   Location Disp: 30 tablet Rfl: 11   cyanocobalamin 1000 MCG/ML Injection Solution Inject 1,000 mcg into the muscle every 30 (thirty) days. Disp:  Rfl:    Calcium Carbonate-Vitamin D (CALCIUM-D) 600-400 MG-UNIT Oral Tab Take 1 tablet by mouth daily.  Disp:  Rfl:    B time,  position sense, vibration intact, decreased pain, LT sensation in her L.eft  T10-12 level, unchanged from last exam,   DTRs   Symmetric 2/4 in all limbs,   No Babinski sign,   COORDINATION: Normal FTN and HTS tests,   GAIT:  Normal gait, can do  tan labs every three months. Give her script.  Check labs and MRI brain spine in a year from last one,   Depression better, but she has more anxiety, will try lexapro 10 mg qd, side effect was given,   Overweight, I asked her to Loss 20 pounds in next six month

## 2018-09-18 NOTE — TELEPHONE ENCOUNTER
Patient called to check status of paper work and wanted to confirm that we have the fax number to send it to  Fax # [phone]

## 2018-09-18 NOTE — TELEPHONE ENCOUNTER
Paperwork initiated, reviewed and signed by Dr Irma Ramirez . Faxed to fax number below, [phone]. Confirmation received. FMLA forms sent to scan, copy in scanning folder.

## 2018-10-12 ENCOUNTER — NURSE ONLY (OUTPATIENT)
Dept: NEUROLOGY | Facility: CLINIC | Age: 38
End: 2018-10-12
Payer: COMMERCIAL

## 2018-10-12 DIAGNOSIS — E53.8 VITAMIN B 12 DEFICIENCY: Primary | ICD-10-CM

## 2018-10-12 PROCEDURE — 96372 THER/PROPH/DIAG INJ SC/IM: CPT | Performed by: OTHER

## 2018-10-12 RX ORDER — CYANOCOBALAMIN 1000 UG/ML
1000 INJECTION INTRAMUSCULAR; SUBCUTANEOUS ONCE
Status: COMPLETED | OUTPATIENT
Start: 2018-10-12 | End: 2018-10-12

## 2018-10-12 RX ADMIN — CYANOCOBALAMIN 1000 MCG: 1000 INJECTION INTRAMUSCULAR; SUBCUTANEOUS at 09:16:00

## 2018-10-12 NOTE — PATIENT INSTRUCTIONS
Refill policies:    • Allow 2-3 business days for refills; controlled substances may take longer.   • Contact your pharmacy at least 5 days prior to running out of medication and have them send an electronic request or submit request through the “request re entire amount billed. Precertification and Prior Authorizations: If your physician has recommended that you have a procedure or additional testing performed.   [facility]) will contact your insurance carrier to obtain pre-certi

## 2018-10-18 DIAGNOSIS — G35 MULTIPLE SCLEROSIS (HCC): ICD-10-CM

## 2018-10-18 RX ORDER — RENAGEL 800 MG/1
TABLET ORAL
Qty: 28 TABLET | Refills: 5 | Status: SHIPPED | OUTPATIENT
Start: 2018-10-18 | End: 2019-03-25

## 2018-10-18 NOTE — TELEPHONE ENCOUNTER
Medication: AUBAGIO 14 MG Oral Tab    Date of last refill: 12/15/17 (#28/12)  [place]    Last office visit: 9/6/2018  Due back to clinic per last office note:  Around 03/06/19  Date next office visit scheduled:     Fu

## 2018-10-24 ENCOUNTER — TELEPHONE (OUTPATIENT)
Dept: NEUROLOGY | Facility: CLINIC | Age: 38
End: 2018-10-24

## 2018-10-24 NOTE — TELEPHONE ENCOUNTER
Patient notified, verbalized understanding and had no further questions/concerns.      --- Message from Marco Srivastava MD sent at 10/23/2018  4:49 PM CDT ---  Labs fine

## 2018-11-02 VITALS
HEIGHT: 66 IN | WEIGHT: 193.5 LBS | SYSTOLIC BLOOD PRESSURE: 122 MMHG | DIASTOLIC BLOOD PRESSURE: 82 MMHG | BODY MASS INDEX: 31.1 KG/M2

## 2018-11-05 VITALS — SYSTOLIC BLOOD PRESSURE: 120 MMHG | HEART RATE: 82 BPM | DIASTOLIC BLOOD PRESSURE: 78 MMHG

## 2018-11-15 ENCOUNTER — TELEPHONE (OUTPATIENT)
Dept: NEUROLOGY | Facility: CLINIC | Age: 38
End: 2018-11-15

## 2018-11-16 ENCOUNTER — NURSE ONLY (OUTPATIENT)
Dept: NEUROLOGY | Facility: CLINIC | Age: 38
End: 2018-11-16
Payer: COMMERCIAL

## 2018-11-16 DIAGNOSIS — E53.8 VITAMIN B 12 DEFICIENCY: Primary | ICD-10-CM

## 2018-11-16 PROCEDURE — 96372 THER/PROPH/DIAG INJ SC/IM: CPT | Performed by: OTHER

## 2018-11-16 RX ORDER — CYANOCOBALAMIN 1000 UG/ML
1000 INJECTION INTRAMUSCULAR; SUBCUTANEOUS ONCE
Status: COMPLETED | OUTPATIENT
Start: 2018-11-16 | End: 2018-11-16

## 2018-11-16 RX ADMIN — CYANOCOBALAMIN 1000 MCG: 1000 INJECTION INTRAMUSCULAR; SUBCUTANEOUS at 09:13:00

## 2018-11-16 NOTE — PROGRESS NOTES
Pt here for 3 of 6 monthly injections. Consent form signed. B12 IM injection administered per MAR. Pt tolerated well.

## 2018-12-07 ENCOUNTER — NURSE ONLY (OUTPATIENT)
Dept: NEUROLOGY | Facility: CLINIC | Age: 38
End: 2018-12-07
Payer: COMMERCIAL

## 2018-12-07 DIAGNOSIS — E53.8 VITAMIN B 12 DEFICIENCY: Primary | ICD-10-CM

## 2018-12-07 PROCEDURE — 96372 THER/PROPH/DIAG INJ SC/IM: CPT | Performed by: OTHER

## 2018-12-07 RX ORDER — CYANOCOBALAMIN 1000 UG/ML
1000 INJECTION INTRAMUSCULAR; SUBCUTANEOUS ONCE
Status: COMPLETED | OUTPATIENT
Start: 2018-12-07 | End: 2018-12-07

## 2018-12-07 RX ADMIN — CYANOCOBALAMIN 1000 MCG: 1000 INJECTION INTRAMUSCULAR; SUBCUTANEOUS at 09:58:00

## 2018-12-07 NOTE — PROGRESS NOTES
Pt here for 4 of 6 monthly injections. Consent form signed. B12 IM injection administered per MAR. Pt tolerated well.

## 2019-01-07 RX ORDER — CYANOCOBALAMIN 1000 UG/ML
INJECTION, SOLUTION INTRAMUSCULAR; SUBCUTANEOUS
COMMUNITY
End: 2019-01-15 | Stop reason: ALTCHOICE

## 2019-01-07 RX ORDER — LEVONORGESTREL / ETHINYL ESTRADIOL AND ETHINYL ESTRADIOL 150-30(84)
KIT ORAL
COMMUNITY
End: 2019-01-15 | Stop reason: CLARIF

## 2019-01-07 RX ORDER — VITAMIN B COMPLEX
TABLET ORAL
COMMUNITY
End: 2019-01-15 | Stop reason: ALTCHOICE

## 2019-01-07 RX ORDER — FLUCONAZOLE 150 MG/1
TABLET ORAL
COMMUNITY
Start: 2018-02-21 | End: 2019-01-15 | Stop reason: ALTCHOICE

## 2019-01-11 ENCOUNTER — NURSE ONLY (OUTPATIENT)
Dept: NEUROLOGY | Facility: CLINIC | Age: 39
End: 2019-01-11
Payer: COMMERCIAL

## 2019-01-11 PROCEDURE — 96372 THER/PROPH/DIAG INJ SC/IM: CPT | Performed by: OTHER

## 2019-01-11 RX ORDER — CYANOCOBALAMIN 1000 UG/ML
1000 INJECTION INTRAMUSCULAR; SUBCUTANEOUS ONCE
Status: COMPLETED | OUTPATIENT
Start: 2019-01-11 | End: 2019-01-11

## 2019-01-11 RX ADMIN — CYANOCOBALAMIN 1000 MCG: 1000 INJECTION INTRAMUSCULAR; SUBCUTANEOUS at 10:03:00

## 2019-01-15 ENCOUNTER — OFFICE VISIT (OUTPATIENT)
Dept: OBGYN | Age: 39
End: 2019-01-15

## 2019-01-15 VITALS
DIASTOLIC BLOOD PRESSURE: 80 MMHG | HEIGHT: 66 IN | WEIGHT: 190.9 LBS | BODY MASS INDEX: 30.68 KG/M2 | SYSTOLIC BLOOD PRESSURE: 120 MMHG

## 2019-01-15 DIAGNOSIS — Z11.4 SCREENING FOR HIV (HUMAN IMMUNODEFICIENCY VIRUS): ICD-10-CM

## 2019-01-15 DIAGNOSIS — Z11.3 SCREEN FOR STD (SEXUALLY TRANSMITTED DISEASE): ICD-10-CM

## 2019-01-15 DIAGNOSIS — Z87.42 HISTORY OF ABNORMAL CERVICAL PAPANICOLAOU SMEAR: ICD-10-CM

## 2019-01-15 DIAGNOSIS — Z01.419 ENCOUNTER FOR ROUTINE GYNECOLOGICAL EXAMINATION WITH PAPANICOLAOU SMEAR OF CERVIX: Primary | ICD-10-CM

## 2019-01-15 DIAGNOSIS — Z11.51 ENCOUNTER FOR SCREENING FOR HUMAN PAPILLOMAVIRUS (HPV): ICD-10-CM

## 2019-01-15 PROBLEM — E03.9 HYPOTHYROID: Status: ACTIVE | Noted: 2019-01-15

## 2019-01-15 PROBLEM — A60.00 GENITAL HSV: Status: ACTIVE | Noted: 2019-01-15

## 2019-01-15 PROBLEM — G35 MULTIPLE SCLEROSIS (CMD): Status: ACTIVE | Noted: 2019-01-15

## 2019-01-15 PROBLEM — B97.7 HPV IN FEMALE: Status: ACTIVE | Noted: 2019-01-15

## 2019-01-15 PROBLEM — G57.90 NEUROPATHY OF LOWER EXTREMITY: Status: ACTIVE | Noted: 2019-01-15

## 2019-01-15 PROCEDURE — 99395 PREV VISIT EST AGE 18-39: CPT | Performed by: OBSTETRICS & GYNECOLOGY

## 2019-01-15 PROCEDURE — 86592 SYPHILIS TEST NON-TREP QUAL: CPT | Performed by: OBSTETRICS & GYNECOLOGY

## 2019-01-15 PROCEDURE — 87389 HIV-1 AG W/HIV-1&-2 AB AG IA: CPT | Performed by: OBSTETRICS & GYNECOLOGY

## 2019-01-15 PROCEDURE — 80074 ACUTE HEPATITIS PANEL: CPT | Performed by: OBSTETRICS & GYNECOLOGY

## 2019-01-15 PROCEDURE — 36415 COLL VENOUS BLD VENIPUNCTURE: CPT | Performed by: OBSTETRICS & GYNECOLOGY

## 2019-01-15 PROCEDURE — 87491 CHLMYD TRACH DNA AMP PROBE: CPT | Performed by: OBSTETRICS & GYNECOLOGY

## 2019-01-15 PROCEDURE — 87591 N.GONORRHOEAE DNA AMP PROB: CPT | Performed by: OBSTETRICS & GYNECOLOGY

## 2019-01-15 RX ORDER — MULTIVIT-MIN/IRON/FOLIC ACID/K 18-600-40
CAPSULE ORAL
COMMUNITY

## 2019-01-15 RX ORDER — LACTOBACILLUS ACIDOPHILUS 500MM CELL
CAPSULE ORAL
COMMUNITY

## 2019-01-15 RX ORDER — BIOTIN 1 MG
TABLET ORAL
COMMUNITY
End: 2022-09-20 | Stop reason: ALTCHOICE

## 2019-01-15 RX ORDER — GABAPENTIN 100 MG/1
CAPSULE ORAL
COMMUNITY
Start: 2018-06-05 | End: 2021-01-25 | Stop reason: SDUPTHER

## 2019-01-15 RX ORDER — LEVONORGESTREL AND ETHINYL ESTRADIOL 0.15-0.03
1 KIT ORAL DAILY
Qty: 1 PACKET | Refills: 3 | Status: SHIPPED | OUTPATIENT
Start: 2019-01-15 | End: 2020-01-22 | Stop reason: SDUPTHER

## 2019-01-15 RX ORDER — LEVONORGESTREL AND ETHINYL ESTRADIOL 0.15-0.03
1 KIT ORAL
COMMUNITY
Start: 2015-06-30 | End: 2019-01-15 | Stop reason: SDUPTHER

## 2019-01-15 RX ORDER — TERIFLUNOMIDE 14 MG/1
14 TABLET, FILM COATED ORAL DAILY
COMMUNITY
Start: 2018-10-18

## 2019-01-15 SDOH — HEALTH STABILITY: MENTAL HEALTH: HOW OFTEN DO YOU HAVE A DRINK CONTAINING ALCOHOL?: NEVER

## 2019-01-15 SDOH — SOCIAL STABILITY: SOCIAL INSECURITY
WITHIN THE LAST YEAR, HAVE TO BEEN RAPED OR FORCED TO HAVE ANY KIND OF SEXUAL ACTIVITY BY YOUR PARTNER OR EX-PARTNER?: NO

## 2019-01-15 SDOH — SOCIAL STABILITY: SOCIAL INSECURITY: WITHIN THE LAST YEAR, HAVE YOU BEEN HUMILIATED OR EMOTIONALLY ABUSED IN OTHER WAYS BY YOUR PARTNER OR EX-PARTNER?: NO

## 2019-01-15 SDOH — SOCIAL STABILITY: SOCIAL INSECURITY
WITHIN THE LAST YEAR, HAVE YOU BEEN KICKED, HIT, SLAPPED, OR OTHERWISE PHYSICALLY HURT BY YOUR PARTNER OR EX-PARTNER?: NO

## 2019-01-15 SDOH — SOCIAL STABILITY: SOCIAL INSECURITY: WITHIN THE LAST YEAR, HAVE YOU BEEN AFRAID OF YOUR PARTNER OR EX-PARTNER?: NO

## 2019-01-16 LAB
HAV IGM SER QL: NEGATIVE
HBV CORE IGM SER QL: NEGATIVE
HBV SURFACE AG SER QL: NEGATIVE
HCV AB SER QL: NEGATIVE
HIV 1+2 AB+HIV1 P24 AG SERPL QL IA: NONREACTIVE
RPR SER QL: NONREACTIVE

## 2019-01-17 LAB
C TRACH RRNA SPEC QL NAA+PROBE: NEGATIVE
N GONORRHOEA RRNA SPEC QL NAA+PROBE: NEGATIVE
SPECIMEN SOURCE: NORMAL

## 2019-01-21 LAB — HPV16+18+45 E6+E7MRNA CVX NAA+PROBE: NORMAL

## 2019-01-22 ENCOUNTER — TELEPHONE (OUTPATIENT)
Dept: OBGYN | Age: 39
End: 2019-01-22

## 2019-02-21 ENCOUNTER — NURSE ONLY (OUTPATIENT)
Dept: NEUROLOGY | Facility: CLINIC | Age: 39
End: 2019-02-21
Payer: COMMERCIAL

## 2019-02-21 DIAGNOSIS — E53.8 VITAMIN B 12 DEFICIENCY: Primary | ICD-10-CM

## 2019-02-21 PROCEDURE — 96372 THER/PROPH/DIAG INJ SC/IM: CPT | Performed by: OTHER

## 2019-02-21 RX ORDER — CYANOCOBALAMIN 1000 UG/ML
1000 INJECTION INTRAMUSCULAR; SUBCUTANEOUS ONCE
Status: COMPLETED | OUTPATIENT
Start: 2019-02-21 | End: 2019-02-21

## 2019-02-21 RX ADMIN — CYANOCOBALAMIN 1000 MCG: 1000 INJECTION INTRAMUSCULAR; SUBCUTANEOUS at 09:23:00

## 2019-02-21 NOTE — PROGRESS NOTES
Pt here for 6th of 6 monthly injections. Consent form signed. B12 IM injection administered per MAR. Pt tolerated well. Per OV notes from 9/6/18, pt to have repeat B12 lab done after 6th injection. Lab order placed.   Printed lab order given to humza

## 2019-03-06 LAB — VITAMIN B12: 381 PG/ML (ref 200–1100)

## 2019-03-07 ENCOUNTER — TELEPHONE (OUTPATIENT)
Dept: NEUROLOGY | Facility: CLINIC | Age: 39
End: 2019-03-07

## 2019-03-07 NOTE — TELEPHONE ENCOUNTER
----- Message from Flaquita Curran MD sent at 3/6/2019  3:43 PM CST -----  B12 is low normal, better than last one, keep same supplement

## 2019-03-08 ENCOUNTER — OFFICE VISIT (OUTPATIENT)
Dept: NEUROLOGY | Facility: CLINIC | Age: 39
End: 2019-03-08
Payer: COMMERCIAL

## 2019-03-08 ENCOUNTER — TELEPHONE (OUTPATIENT)
Dept: NEUROLOGY | Facility: CLINIC | Age: 39
End: 2019-03-08

## 2019-03-08 VITALS
RESPIRATION RATE: 18 BRPM | DIASTOLIC BLOOD PRESSURE: 70 MMHG | BODY MASS INDEX: 31 KG/M2 | WEIGHT: 194 LBS | SYSTOLIC BLOOD PRESSURE: 130 MMHG | HEART RATE: 74 BPM

## 2019-03-08 DIAGNOSIS — M25.559 PAIN IN JOINT INVOLVING PELVIC REGION AND THIGH, UNSPECIFIED LATERALITY: ICD-10-CM

## 2019-03-08 DIAGNOSIS — G62.9 SENSORY NEUROPATHY: ICD-10-CM

## 2019-03-08 DIAGNOSIS — R53.82 CHRONIC FATIGUE: Primary | ICD-10-CM

## 2019-03-08 DIAGNOSIS — G35 MULTIPLE SCLEROSIS (HCC): ICD-10-CM

## 2019-03-08 DIAGNOSIS — R20.0 NUMBNESS AND TINGLING OF FOOT: ICD-10-CM

## 2019-03-08 DIAGNOSIS — E53.8 VITAMIN B 12 DEFICIENCY: ICD-10-CM

## 2019-03-08 DIAGNOSIS — R20.2 NUMBNESS AND TINGLING OF FOOT: ICD-10-CM

## 2019-03-08 PROCEDURE — 99215 OFFICE O/P EST HI 40 MIN: CPT | Performed by: OTHER

## 2019-03-08 RX ORDER — GABAPENTIN 100 MG/1
100 CAPSULE ORAL 3 TIMES DAILY
Qty: 90 CAPSULE | Refills: 5 | Status: SHIPPED | OUTPATIENT
Start: 2019-03-08 | End: 2019-10-22

## 2019-03-08 RX ORDER — GABAPENTIN 100 MG/1
100 CAPSULE ORAL 3 TIMES DAILY
Qty: 90 CAPSULE | Refills: 5 | Status: SHIPPED | OUTPATIENT
Start: 2019-03-08 | End: 2019-03-08

## 2019-03-08 NOTE — PROGRESS NOTES
[facility] in [place] with [facility] [place], operated by [facility]  Neurology - Clinic Follow up  3/8/2019    Liz Snider Patient Status:  No patient class for patient encounter    1980 MRN [id number]   Location @ by Umesh Duckworth MD at [facility]       ALLERGIES:  No Known Allergies    MEDICATIONS: EMR reviewed    Current Outpatient Medications:  gabapentin 100 MG Oral Cap Take 1 capsule (100 mg total) by mouth 3 (three) times daily.  Disp: 90 capsule Rf NO drift. EMIGDIO intact, normal tone, normal strength, weakness in L.  Great toe dorsal extension, 4/5, FE 4/5, new from last exam,   Sensory: decreased  Light touch, pin to ankle and wrist level bilaterally,  New from last time,  position sense, vibration int neuropathy  Plan: gabapentin 100 MG Oral Cap      She has new numbness, tingling in both arms and feet, EMG of arms and legs, showed sensory neuropathy,    Neuropathy labs showed low B12 and B6, it can cause all of her current new symptoms,   B complex yohannes

## 2019-03-08 NOTE — PROGRESS NOTES
The patient is here for a follow-up for MS. The patient states her neuropathy is worse. The patient is having extreme fatigue, nauseous, swelling of the hands and difficultly swallowing.

## 2019-03-08 NOTE — TELEPHONE ENCOUNTER
Gabapentin request was sent to [address] pharmacy ([facility]). Should have been  sent to [facility] [address] in [facility].

## 2019-03-25 DIAGNOSIS — G35 MULTIPLE SCLEROSIS (HCC): ICD-10-CM

## 2019-03-25 RX ORDER — RENAGEL 800 MG/1
TABLET ORAL
Qty: 28 TABLET | Refills: 5 | Status: SHIPPED | OUTPATIENT
Start: 2019-03-25 | End: 2019-10-03

## 2019-03-25 RX ORDER — LEVONORGESTREL AND ETHINYL ESTRADIOL 0.15-0.03
KIT ORAL
Qty: 91 TABLET | Refills: 3 | OUTPATIENT
Start: 2019-03-25

## 2019-03-25 NOTE — TELEPHONE ENCOUNTER
Medication: AUBAGIO 14 MG Oral Tab    Date of last refill: 10/18/18 (#28/5)  [place]    Last office visit: 3/8/2019  Due back to clinic per last office note:  Around 09/08/19  Date next office visit scheduled:     Fut

## 2019-04-08 ENCOUNTER — TELEPHONE (OUTPATIENT)
Dept: NEUROLOGY | Facility: CLINIC | Age: 39
End: 2019-04-08

## 2019-04-08 NOTE — TELEPHONE ENCOUNTER
Received Dexa scan results from [facility] dated 4/6/19.  Placed in Dr marcum for review

## 2019-07-09 ENCOUNTER — TELEPHONE (OUTPATIENT)
Dept: NEUROLOGY | Facility: CLINIC | Age: 39
End: 2019-07-09

## 2019-07-09 DIAGNOSIS — G35 MULTIPLE SCLEROSIS (HCC): Primary | ICD-10-CM

## 2019-08-06 NOTE — TELEPHONE ENCOUNTER
Spoke with pt, informed her that new PA would be started due to her insurance being changed. PA started on CMM with key [id number]    Pt informed to call MS one to one to ask if they can provide short supply of medication while PA is being processed.  Pt

## 2019-08-06 NOTE — TELEPHONE ENCOUNTER
Spoke with pt, informed her that [employer] had been sent to [address] [place] [address], and that medication was approved on CMM.

## 2019-08-06 NOTE — TELEPHONE ENCOUNTER
Received notification that PA on CMM with key [id number] has been approved per note below. Request Reference Number: [id number]. AUBAGIO TAB 14MG is approved through 08/06/2024. For further questions, call [phone]. Pt notified.  New RX pended an

## 2019-08-13 NOTE — TELEPHONE ENCOUNTER
Fax from [facility] one to one was sent to [facility] requesting copy of PA be faxed. No approval found in [employer]. Called [facility] to have copy of approval faxed to BH[place]I. Fax of approval received in [place] and faxed with confirmation to MS one to one.     Copy of

## 2019-09-24 LAB
ABSOLUTE BASOPHILS: 58 CELLS/UL (ref 0–200)
ABSOLUTE EOSINOPHILS: 192 CELLS/UL (ref 15–500)
ABSOLUTE LYMPHOCYTES: 1056 CELLS/UL (ref 850–3900)
ABSOLUTE MONOCYTES: 640 CELLS/UL (ref 200–950)
ABSOLUTE NEUTROPHILS: 4454 CELLS/UL (ref 1500–7800)
ALBUMIN/GLOBULIN RATIO: 1.3 (CALC) (ref 1–2.5)
ALBUMIN: 3.3 G/DL (ref 3.6–5.1)
ALKALINE PHOSPHATASE: 52 U/L (ref 33–115)
ALT: 11 U/L (ref 6–29)
AST: 15 U/L (ref 10–30)
BASOPHILS: 0.9 %
BILIRUBIN, TOTAL: 0.6 MG/DL (ref 0.2–1.2)
BUN: 8 MG/DL (ref 7–25)
CALCIUM: 8.7 MG/DL (ref 8.6–10.2)
CARBON DIOXIDE: 23 MMOL/L (ref 20–32)
CHLORIDE: 108 MMOL/L (ref 98–110)
CREATININE: 0.79 MG/DL (ref 0.5–1.1)
EGFR IF AFRICN AM: 109 ML/MIN/1.73M2
EGFR IF NONAFRICN AM: 94 ML/MIN/1.73M2
EOSINOPHILS: 3 %
GLOBULIN: 2.5 G/DL (CALC) (ref 1.9–3.7)
GLUCOSE: 84 MG/DL (ref 65–99)
HEMATOCRIT: 37.9 % (ref 35–45)
HEMOGLOBIN: 12.7 G/DL (ref 11.7–15.5)
LYMPHOCYTES: 16.5 %
MCH: 30 PG (ref 27–33)
MCHC: 33.5 G/DL (ref 32–36)
MCV: 89.4 FL (ref 80–100)
MONOCYTES: 10 %
MPV: 11.6 FL (ref 7.5–12.5)
NEUTROPHILS: 69.6 %
PLATELET COUNT: 243 THOUSAND/UL (ref 140–400)
POTASSIUM: 4.2 MMOL/L (ref 3.5–5.3)
PROTEIN, TOTAL: 5.8 G/DL (ref 6.1–8.1)
RDW: 12.4 % (ref 11–15)
RED BLOOD CELL COUNT: 4.24 MILLION/UL (ref 3.8–5.1)
SODIUM: 137 MMOL/L (ref 135–146)
WHITE BLOOD CELL COUNT: 6.4 THOUSAND/UL (ref 3.8–10.8)

## 2019-09-25 ENCOUNTER — TELEPHONE (OUTPATIENT)
Dept: NEUROLOGY | Facility: CLINIC | Age: 39
End: 2019-09-25

## 2019-09-25 NOTE — TELEPHONE ENCOUNTER
[employer] message sent to patient with the below results.       ----- Message from Katia Walter MD sent at 9/24/2019  1:01 PM CDT -----  Lab normal

## 2019-09-27 ENCOUNTER — TELEPHONE (OUTPATIENT)
Dept: NEUROLOGY | Facility: CLINIC | Age: 39
End: 2019-09-27

## 2019-09-27 NOTE — TELEPHONE ENCOUNTER
Spoke with pt, relayed results below, she expressed understanding and was encouraged to call office with any further questions or concerns.

## 2019-10-03 DIAGNOSIS — G35 MULTIPLE SCLEROSIS (HCC): ICD-10-CM

## 2019-10-03 NOTE — TELEPHONE ENCOUNTER
BRYN to schedule f/u farooq't before furture refills.       Medication: Aubagio 14mg    Date of last refill: 3/25/19 (#28/5)  [place] [birth year]    Last office visit: 3/8/19  Due back to clinic per last office note:  6 months

## 2019-10-04 NOTE — TELEPHONE ENCOUNTER
Rx set up to be sent to regular mail order, not specialty pharmacy. New Rx sent to [facility] pharmacy as written order.

## 2019-10-07 ENCOUNTER — TELEPHONE (OUTPATIENT)
Dept: NEUROLOGY | Facility: CLINIC | Age: 39
End: 2019-10-07

## 2019-10-07 DIAGNOSIS — G35 MULTIPLE SCLEROSIS (HCC): ICD-10-CM

## 2019-10-07 NOTE — TELEPHONE ENCOUNTER
Pt sent MyChart message stating last Aubagio refill was sent to wrong pharmacy. Pt would like medication sent to [facility]. New order signed as written order, sent to [facility].

## 2019-10-22 ENCOUNTER — TELEPHONE (OUTPATIENT)
Dept: NEUROLOGY | Facility: CLINIC | Age: 39
End: 2019-10-22

## 2019-10-22 ENCOUNTER — OFFICE VISIT (OUTPATIENT)
Dept: NEUROLOGY | Facility: CLINIC | Age: 39
End: 2019-10-22
Payer: COMMERCIAL

## 2019-10-22 VITALS
BODY MASS INDEX: 32 KG/M2 | WEIGHT: 199 LBS | SYSTOLIC BLOOD PRESSURE: 100 MMHG | DIASTOLIC BLOOD PRESSURE: 60 MMHG | RESPIRATION RATE: 16 BRPM | HEART RATE: 80 BPM

## 2019-10-22 DIAGNOSIS — G62.9 SENSORY NEUROPATHY: ICD-10-CM

## 2019-10-22 DIAGNOSIS — R53.82 CHRONIC FATIGUE: ICD-10-CM

## 2019-10-22 DIAGNOSIS — R23.2 HOT FLASH DUE TO MEDICATION: ICD-10-CM

## 2019-10-22 DIAGNOSIS — H81.10 BENIGN PAROXYSMAL POSITIONAL VERTIGO, UNSPECIFIED LATERALITY: ICD-10-CM

## 2019-10-22 DIAGNOSIS — R20.0 NUMBNESS AND TINGLING OF FOOT: ICD-10-CM

## 2019-10-22 DIAGNOSIS — M25.559 PAIN IN JOINT INVOLVING PELVIC REGION AND THIGH, UNSPECIFIED LATERALITY: ICD-10-CM

## 2019-10-22 DIAGNOSIS — R20.2 NUMBNESS AND TINGLING OF FOOT: ICD-10-CM

## 2019-10-22 DIAGNOSIS — T50.905A HOT FLASH DUE TO MEDICATION: ICD-10-CM

## 2019-10-22 DIAGNOSIS — R20.9 DISTURBANCE OF SKIN SENSATION: ICD-10-CM

## 2019-10-22 DIAGNOSIS — E53.8 VITAMIN B 12 DEFICIENCY: ICD-10-CM

## 2019-10-22 DIAGNOSIS — G35 MULTIPLE SCLEROSIS (HCC): Primary | ICD-10-CM

## 2019-10-22 PROCEDURE — 99215 OFFICE O/P EST HI 40 MIN: CPT | Performed by: OTHER

## 2019-10-22 RX ORDER — GABAPENTIN 100 MG/1
200 CAPSULE ORAL 3 TIMES DAILY
Qty: 180 CAPSULE | Refills: 5 | COMMUNITY
Start: 2019-10-22 | End: 2020-04-09 | Stop reason: DRUGHIGH

## 2019-10-22 RX ORDER — GABAPENTIN 100 MG/1
200 CAPSULE ORAL 3 TIMES DAILY
Qty: 180 CAPSULE | Refills: 5 | Status: SHIPPED | OUTPATIENT
Start: 2019-10-22 | End: 2019-10-22

## 2019-10-22 NOTE — TELEPHONE ENCOUNTER
Patient request Rx Gabapentin be called to [facility] in [facility]. Rx Gabapentin 100mg #180 2 caps tid with 5 refills called to [facility] with read back confirmation received.     [employer] representative (Carol) notified to cancel Rx Gabapentin order that was e

## 2019-10-22 NOTE — PROGRESS NOTES
[facility] [place] [facility]  Neurology - Clinic Follow up  10/22/2019    Marcella Calderon Patient Status:  No patient class for patient encounter    1980 MRN [id number]   Location Solution, Inject 1,000 mcg into the muscle every 30 (thirty) days. , Disp: , Rfl:   Calcium Carbonate-Vitamin D (CALCIUM-D) 600-400 MG-UNIT Oral Tab, Take 1 tablet by mouth daily. 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        Problem List:  Problem List Items Addressed This Vi she needs to check bone density and V-D from primary office,        Return in about 6 months (around 4/22/2020).   Adjust Neurontin dose,     James Davila MD  General Neurology and Electrodiagnostic specialist  [facility]  10/22/2019

## 2020-01-07 NOTE — TELEPHONE ENCOUNTER
[facility] hosp[facility] Need to provide patient with re certification, they are requesting some of patient's records. If this is something we can do, please fax the following, if this is something we cannot do, please call Josefina to let her know she will hear back from our records dept. Peña Carias will be faxing request to [address] # [phone] and to the records department. Peña Carias will be waiting for a call back from us either way, to let her know we can handle this or to let her know the records dept will have to provide this info needed.      Labs, hospital records, last doctor appt notes, diagnostic records      Please fax office notes to FAX # [phone] INTAKE DEPT Pt to switch to Aubagio, due to +JCV (see TE dated 6/30/17 for test results.)    Referral initiated for PA for medication. Order placed for TB skin test.  Need to inform patient of need for TB test when she comes in to sign paperwork.     Pended paperwor

## 2020-01-22 ENCOUNTER — OFFICE VISIT (OUTPATIENT)
Dept: OBGYN | Age: 40
End: 2020-01-22

## 2020-01-22 VITALS
WEIGHT: 188.4 LBS | SYSTOLIC BLOOD PRESSURE: 118 MMHG | DIASTOLIC BLOOD PRESSURE: 84 MMHG | HEIGHT: 66 IN | BODY MASS INDEX: 30.28 KG/M2

## 2020-01-22 DIAGNOSIS — Z12.31 ENCOUNTER FOR MAMMOGRAM TO ESTABLISH BASELINE MAMMOGRAM: ICD-10-CM

## 2020-01-22 DIAGNOSIS — Z11.3 ROUTINE SCREENING FOR STI (SEXUALLY TRANSMITTED INFECTION): Primary | ICD-10-CM

## 2020-01-22 DIAGNOSIS — Z12.31 ENCOUNTER FOR SCREENING MAMMOGRAM FOR MALIGNANT NEOPLASM OF BREAST: ICD-10-CM

## 2020-01-22 DIAGNOSIS — Z11.4 ENCOUNTER FOR SCREENING FOR HIV: ICD-10-CM

## 2020-01-22 PROCEDURE — 86592 SYPHILIS TEST NON-TREP QUAL: CPT | Performed by: OBSTETRICS & GYNECOLOGY

## 2020-01-22 PROCEDURE — 87591 N.GONORRHOEAE DNA AMP PROB: CPT | Performed by: OBSTETRICS & GYNECOLOGY

## 2020-01-22 PROCEDURE — 80074 ACUTE HEPATITIS PANEL: CPT | Performed by: OBSTETRICS & GYNECOLOGY

## 2020-01-22 PROCEDURE — 87389 HIV-1 AG W/HIV-1&-2 AB AG IA: CPT | Performed by: OBSTETRICS & GYNECOLOGY

## 2020-01-22 PROCEDURE — 99396 PREV VISIT EST AGE 40-64: CPT | Performed by: OBSTETRICS & GYNECOLOGY

## 2020-01-22 PROCEDURE — 87491 CHLMYD TRACH DNA AMP PROBE: CPT | Performed by: OBSTETRICS & GYNECOLOGY

## 2020-01-22 PROCEDURE — 36415 COLL VENOUS BLD VENIPUNCTURE: CPT

## 2020-01-22 PROCEDURE — 87661 TRICHOMONAS VAGINALIS AMPLIF: CPT | Performed by: OBSTETRICS & GYNECOLOGY

## 2020-01-22 RX ORDER — LEVONORGESTREL AND ETHINYL ESTRADIOL 0.15-0.03
1 KIT ORAL DAILY
Qty: 1 PACKET | Refills: 3 | Status: SHIPPED | OUTPATIENT
Start: 2020-01-22 | End: 2021-01-25 | Stop reason: SDUPTHER

## 2020-01-22 ASSESSMENT — PATIENT HEALTH QUESTIONNAIRE - PHQ9
SUM OF ALL RESPONSES TO PHQ9 QUESTIONS 1 AND 2: 0
2. FEELING DOWN, DEPRESSED OR HOPELESS: NOT AT ALL
1. LITTLE INTEREST OR PLEASURE IN DOING THINGS: NOT AT ALL
SUM OF ALL RESPONSES TO PHQ9 QUESTIONS 1 AND 2: 0

## 2020-01-23 LAB
C TRACH RRNA SPEC QL NAA+PROBE: NEGATIVE
HAV IGM SER QL: NEGATIVE
HBV CORE IGM SER QL: NEGATIVE
HBV SURFACE AG SER QL: NEGATIVE
HCV AB SER QL: NEGATIVE
HIV 1+2 AB+HIV1 P24 AG SERPL QL IA: NONREACTIVE
N GONORRHOEA RRNA SPEC QL NAA+PROBE: NEGATIVE
RPR SER QL: NONREACTIVE
SPECIMEN SOURCE: NORMAL
SPECIMEN SOURCE: NORMAL
T VAGINALIS RRNA SPEC QL NAA+PROBE: NEGATIVE

## 2020-01-27 LAB — SERVICE CMNT-IMP: NORMAL

## 2020-02-06 ENCOUNTER — PATIENT MESSAGE (OUTPATIENT)
Dept: NEUROLOGY | Facility: CLINIC | Age: 40
End: 2020-02-06

## 2020-02-06 ENCOUNTER — TELEPHONE (OUTPATIENT)
Dept: NEUROLOGY | Facility: CLINIC | Age: 40
End: 2020-02-06

## 2020-02-06 DIAGNOSIS — R20.0 NUMBNESS AND TINGLING OF FOOT: ICD-10-CM

## 2020-02-06 DIAGNOSIS — M25.559 PAIN IN JOINT INVOLVING PELVIC REGION AND THIGH, UNSPECIFIED LATERALITY: Primary | ICD-10-CM

## 2020-02-06 DIAGNOSIS — G62.9 SENSORY NEUROPATHY: ICD-10-CM

## 2020-02-06 DIAGNOSIS — R20.9 DISTURBANCE OF SKIN SENSATION: ICD-10-CM

## 2020-02-06 DIAGNOSIS — R20.2 NUMBNESS AND TINGLING OF FOOT: ICD-10-CM

## 2020-02-06 NOTE — TELEPHONE ENCOUNTER
From: Keyanna Pham  To:  Yuliana Moseley MD  Sent: 2/6/2020 4:16 PM CST  Subject: Prescription Question    Good afternoon Dr. Lori Chavez   I was reaching out because when I last saw you told me if the gabapentin wasn’t working to let you know and you would increase the

## 2020-02-06 NOTE — TELEPHONE ENCOUNTER
Fax received from [facility] stating they have been unable to reach patient to arrange Aubagio delivery. Spoke with patient who states she plans to call [facility] today.

## 2020-02-07 NOTE — TELEPHONE ENCOUNTER
Rx Neurontin 300mg take 2 caps tid pended for MD review and signature.       Change her Neurontin to 600 mg tid     Mac Martin    Routing comment

## 2020-02-10 RX ORDER — GABAPENTIN 300 MG/1
300 CAPSULE ORAL 3 TIMES DAILY
Qty: 180 CAPSULE | Refills: 0 | Status: SHIPPED | OUTPATIENT
Start: 2020-02-10 | End: 2020-04-09

## 2020-02-11 ENCOUNTER — TELEPHONE (OUTPATIENT)
Dept: OBGYN | Age: 40
End: 2020-02-11

## 2020-04-09 DIAGNOSIS — R20.0 NUMBNESS AND TINGLING OF FOOT: ICD-10-CM

## 2020-04-09 DIAGNOSIS — G62.9 SENSORY NEUROPATHY: ICD-10-CM

## 2020-04-09 DIAGNOSIS — R20.9 DISTURBANCE OF SKIN SENSATION: ICD-10-CM

## 2020-04-09 DIAGNOSIS — R20.2 NUMBNESS AND TINGLING OF FOOT: ICD-10-CM

## 2020-04-09 DIAGNOSIS — M25.559 PAIN IN JOINT INVOLVING PELVIC REGION AND THIGH, UNSPECIFIED LATERALITY: ICD-10-CM

## 2020-04-09 RX ORDER — GABAPENTIN 300 MG/1
300 CAPSULE ORAL 3 TIMES DAILY
Qty: 180 CAPSULE | Refills: 0 | Status: SHIPPED | OUTPATIENT
Start: 2020-04-09 | End: 2020-08-03

## 2020-04-09 NOTE — TELEPHONE ENCOUNTER
Pt due for follow up appt. Spoke with patient and she states that she is doing well on the current dose of Gabapentin, and can feels a difference if she misses a dose.   She prefers to not schedule a phone or video consult at this time, but indicates that

## 2020-05-28 ENCOUNTER — TELEPHONE (OUTPATIENT)
Dept: SCHEDULING | Age: 40
End: 2020-05-28

## 2020-06-23 ENCOUNTER — TELEPHONE (OUTPATIENT)
Dept: NEUROLOGY | Facility: CLINIC | Age: 40
End: 2020-06-23

## 2020-06-23 NOTE — TELEPHONE ENCOUNTER
Copy of patient insurance card and patient face sheet faxed to [facility] with fax confirmation received. [facility] requests this information to process Aubagio order.

## 2020-07-26 ENCOUNTER — HOSPITAL ENCOUNTER (EMERGENCY)
Age: 40
Discharge: HOME | End: 2020-07-26
Attending: EMERGENCY MEDICINE

## 2020-07-26 ENCOUNTER — APPOINTMENT (OUTPATIENT)
Dept: CT IMAGING | Age: 40
End: 2020-07-26
Attending: EMERGENCY MEDICINE

## 2020-07-26 VITALS
HEIGHT: 66 IN | TEMPERATURE: 98.3 F | HEART RATE: 97 BPM | RESPIRATION RATE: 15 BRPM | WEIGHT: 200.4 LBS | SYSTOLIC BLOOD PRESSURE: 132 MMHG | BODY MASS INDEX: 32.21 KG/M2 | OXYGEN SATURATION: 98 % | DIASTOLIC BLOOD PRESSURE: 65 MMHG

## 2020-07-26 DIAGNOSIS — R06.02 SHORTNESS OF BREATH: ICD-10-CM

## 2020-07-26 DIAGNOSIS — Z72.89 VAPES NON-NICOTINE CONTAINING SUBSTANCE: Primary | ICD-10-CM

## 2020-07-26 DIAGNOSIS — Z87.39 PERSONAL HISTORY OF DISEASES OF THE MS SYS AND CONN TISS: ICD-10-CM

## 2020-07-26 DIAGNOSIS — F41.0 PANIC ATTACK: ICD-10-CM

## 2020-07-26 DIAGNOSIS — F41.1 ANXIETY REACTION: ICD-10-CM

## 2020-07-26 DIAGNOSIS — F12.90 MARIJUANA USE, CONTINUOUS: ICD-10-CM

## 2020-07-26 PROBLEM — E53.8 VITAMIN B 12 DEFICIENCY: Status: ACTIVE | Noted: 2018-01-16

## 2020-07-26 PROBLEM — M54.50 CHRONIC RIGHT-SIDED LOW BACK PAIN: Status: ACTIVE | Noted: 2017-12-15

## 2020-07-26 PROBLEM — R20.2 NUMBNESS AND TINGLING OF FOOT: Status: ACTIVE | Noted: 2017-12-15

## 2020-07-26 PROBLEM — R53.82 CHRONIC FATIGUE: Status: ACTIVE | Noted: 2018-01-16

## 2020-07-26 PROBLEM — G89.29 CHRONIC RIGHT-SIDED LOW BACK PAIN: Status: ACTIVE | Noted: 2017-12-15

## 2020-07-26 PROBLEM — G62.9 SENSORY NEUROPATHY: Status: ACTIVE | Noted: 2018-01-16

## 2020-07-26 PROBLEM — R20.0 NUMBNESS AND TINGLING OF FOOT: Status: ACTIVE | Noted: 2017-12-15

## 2020-07-26 LAB
ALBUMIN SERPL-MCNC: 2.9 G/DL (ref 3.6–5.1)
ALP SERPL-CCNC: 58 UNITS/L (ref 45–117)
ALT SERPL-CCNC: 13 UNITS/L
AMPHETAMINES UR QL SCN>500 NG/ML: NEGATIVE
ANION GAP SERPL CALC-SCNC: 10 MMOL/L (ref 10–20)
APPEARANCE UR: ABNORMAL
AST SERPL-CCNC: 16 UNITS/L
ATRIAL RATE (BPM): 114
BARBITURATES UR QL SCN>200 NG/ML: NEGATIVE
BASOPHILS # BLD: 0.1 K/MCL (ref 0–0.3)
BASOPHILS NFR BLD: 1 %
BENZODIAZ UR QL SCN>200 NG/ML: NEGATIVE
BILIRUB CONJ SERPL-MCNC: 0.1 MG/DL (ref 0–0.2)
BILIRUB SERPL-MCNC: 0.5 MG/DL (ref 0.2–1)
BILIRUB UR QL STRIP: NEGATIVE
BUN SERPL-MCNC: 10 MG/DL (ref 6–20)
BUN/CREAT SERPL: 13 (ref 7–25)
BZE UR QL SCN>150 NG/ML: NEGATIVE
CALCIUM SERPL-MCNC: 8.2 MG/DL (ref 8.4–10.2)
CANNABINOIDS UR QL SCN>50 NG/ML: POSITIVE
CHLORIDE SERPL-SCNC: 111 MMOL/L (ref 98–107)
CO2 SERPL-SCNC: 24 MMOL/L (ref 21–32)
COLOR UR: ABNORMAL
CREAT SERPL-MCNC: 0.78 MG/DL (ref 0.51–0.95)
D DIMER PPP FEU-MCNC: 2.04 MG/L (FEU)
DIFFERENTIAL METHOD BLD: NORMAL
EOSINOPHIL # BLD: 0.1 K/MCL (ref 0.1–0.5)
EOSINOPHIL NFR BLD: 2 %
ERYTHROCYTE [DISTWIDTH] IN BLOOD: 13.2 % (ref 11–15)
ETHANOL SERPL-MCNC: NORMAL MG/DL
GLUCOSE SERPL-MCNC: 159 MG/DL (ref 65–99)
GLUCOSE UR STRIP-MCNC: ABNORMAL MG/DL
HCG UR QL: NEGATIVE
HCT VFR BLD CALC: 38.4 % (ref 36–46.5)
HGB BLD-MCNC: 12.3 G/DL (ref 12–15.5)
HGB UR QL STRIP: NEGATIVE
IMM GRANULOCYTES # BLD AUTO: 0.1 K/MCL (ref 0–0.2)
IMM GRANULOCYTES NFR BLD: 1 %
KETONES UR STRIP-MCNC: NEGATIVE MG/DL
LEUKOCYTE ESTERASE UR QL STRIP: NEGATIVE
LYMPHOCYTES # BLD: 1.5 K/MCL (ref 1–4.8)
LYMPHOCYTES NFR BLD: 17 %
MAGNESIUM SERPL-MCNC: 1.7 MG/DL (ref 1.7–2.4)
MCH RBC QN AUTO: 29.4 PG (ref 26–34)
MCHC RBC AUTO-ENTMCNC: 32 G/DL (ref 32–36.5)
MCV RBC AUTO: 91.9 FL (ref 78–100)
MONOCYTES # BLD: 0.7 K/MCL (ref 0.3–0.9)
MONOCYTES NFR BLD: 7 %
NEUTROPHILS # BLD: 6.9 K/MCL (ref 1.8–7.7)
NEUTROPHILS NFR BLD: 72 %
NITRITE UR QL STRIP: NEGATIVE
NRBC BLD MANUAL-RTO: 0 /100 WBC
OPIATES UR QL SCN>300 NG/ML: NEGATIVE
P AXIS (DEGREES): 40
PCP UR QL SCN>25 NG/ML: NEGATIVE
PH UR STRIP: 5 UNITS (ref 5–7)
PLATELET # BLD: 282 K/MCL (ref 140–450)
POTASSIUM SERPL-SCNC: 3.6 MMOL/L (ref 3.4–5.1)
PR-INTERVAL (MSEC): 148
PROT SERPL-MCNC: 7 G/DL (ref 6.4–8.2)
PROT UR STRIP-MCNC: NEGATIVE MG/DL
QRS-INTERVAL (MSEC): 96
QT-INTERVAL (MSEC): 362
QTC: 498
R AXIS (DEGREES): 51
RBC # BLD: 4.18 MIL/MCL (ref 4–5.2)
REPORT TEXT: NORMAL
SODIUM SERPL-SCNC: 141 MMOL/L (ref 135–145)
SP GR UR STRIP: >1.03 (ref 1–1.03)
SPECIMEN SOURCE: ABNORMAL
T AXIS (DEGREES): 48
TROPONIN I SERPL HS-MCNC: <0.02 NG/ML
TSH SERPL-ACNC: 1.74 MCUNITS/ML (ref 0.35–5)
UROBILINOGEN UR STRIP-MCNC: 0.2 MG/DL (ref 0–1)
VENTRICULAR RATE EKG/MIN (BPM): 114
WBC # BLD: 9.3 K/MCL (ref 4.2–11)

## 2020-07-26 PROCEDURE — 99285 EMERGENCY DEPT VISIT HI MDM: CPT

## 2020-07-26 PROCEDURE — 80320 DRUG SCREEN QUANTALCOHOLS: CPT

## 2020-07-26 PROCEDURE — 10002803 HB RX 637: Performed by: PHYSICIAN ASSISTANT

## 2020-07-26 PROCEDURE — 96360 HYDRATION IV INFUSION INIT: CPT

## 2020-07-26 PROCEDURE — 81003 URINALYSIS AUTO W/O SCOPE: CPT

## 2020-07-26 PROCEDURE — 80076 HEPATIC FUNCTION PANEL: CPT

## 2020-07-26 PROCEDURE — 85379 FIBRIN DEGRADATION QUANT: CPT

## 2020-07-26 PROCEDURE — 93005 ELECTROCARDIOGRAM TRACING: CPT | Performed by: EMERGENCY MEDICINE

## 2020-07-26 PROCEDURE — 10002807 HB RX 258: Performed by: PHYSICIAN ASSISTANT

## 2020-07-26 PROCEDURE — 84443 ASSAY THYROID STIM HORMONE: CPT

## 2020-07-26 PROCEDURE — 10002805 HB CONTRAST AGENT: Performed by: PHYSICIAN ASSISTANT

## 2020-07-26 PROCEDURE — 71275 CT ANGIOGRAPHY CHEST: CPT

## 2020-07-26 PROCEDURE — 83735 ASSAY OF MAGNESIUM: CPT

## 2020-07-26 PROCEDURE — 84703 CHORIONIC GONADOTROPIN ASSAY: CPT

## 2020-07-26 PROCEDURE — 84484 ASSAY OF TROPONIN QUANT: CPT

## 2020-07-26 PROCEDURE — 80307 DRUG TEST PRSMV CHEM ANLYZR: CPT

## 2020-07-26 PROCEDURE — 85025 COMPLETE CBC W/AUTO DIFF WBC: CPT

## 2020-07-26 PROCEDURE — 80048 BASIC METABOLIC PNL TOTAL CA: CPT

## 2020-07-26 RX ORDER — ALPRAZOLAM 0.25 MG/1
0.25 TABLET ORAL ONCE
Status: COMPLETED | OUTPATIENT
Start: 2020-07-26 | End: 2020-07-26

## 2020-07-26 RX ADMIN — ALPRAZOLAM 0.25 MG: 0.25 TABLET ORAL at 16:52

## 2020-07-26 RX ADMIN — IOHEXOL 100 ML: 350 INJECTION, SOLUTION INTRAVENOUS at 17:15

## 2020-07-26 RX ADMIN — SODIUM CHLORIDE 1000 ML: 0.9 INJECTION, SOLUTION INTRAVENOUS at 16:51

## 2020-07-26 SDOH — HEALTH STABILITY: MENTAL HEALTH: HOW OFTEN DO YOU HAVE A DRINK CONTAINING ALCOHOL?: NEVER

## 2020-07-26 ASSESSMENT — ENCOUNTER SYMPTOMS
NUMBNESS: 0
COUGH: 0
ABDOMINAL PAIN: 0
DIZZINESS: 0
DIARRHEA: 0
CHILLS: 0
NAUSEA: 0
SHORTNESS OF BREATH: 0
VOMITING: 0
CONSTIPATION: 0
SORE THROAT: 0
FEVER: 0

## 2020-07-26 ASSESSMENT — PAIN SCALES - GENERAL: PAINLEVEL_OUTOF10: 5

## 2020-07-26 ASSESSMENT — PAIN DESCRIPTION - PAIN TYPE: TYPE: CHEST PAIN

## 2020-07-27 ENCOUNTER — TELEPHONE (OUTPATIENT)
Dept: NEUROLOGY | Facility: CLINIC | Age: 40
End: 2020-07-27

## 2020-07-27 NOTE — TELEPHONE ENCOUNTER
MLTCB on VM (ok per HIPAA consent. Ok to schedule patient on 7/31 at 12:00 per Dr Tyree Valdes.       Digna Mccloud MD  You 3 minutes ago (8:46 AM)      May add one this Friday last patient     Kim Bello MD    Routing comment

## 2020-07-27 NOTE — TELEPHONE ENCOUNTER
S: Sherice Stage to [employer] [facility] 7/26/20.     B: LOV on 10/22/19.     She has new numbness, tingling in both arms and feet, EMG of arms and legs, showed sensory neuropathy,    Neuropathy labs showed low B12 and B6, it can cause all of her current new symptoms,

## 2020-07-28 ENCOUNTER — PATIENT MESSAGE (OUTPATIENT)
Dept: NEUROLOGY | Facility: CLINIC | Age: 40
End: 2020-07-28

## 2020-07-28 DIAGNOSIS — G35 MULTIPLE SCLEROSIS (HCC): Primary | ICD-10-CM

## 2020-07-29 ENCOUNTER — TELEPHONE (OUTPATIENT)
Dept: NEUROLOGY | Facility: CLINIC | Age: 40
End: 2020-07-29

## 2020-07-30 ENCOUNTER — TELEPHONE (OUTPATIENT)
Dept: NEUROLOGY | Facility: CLINIC | Age: 40
End: 2020-07-30

## 2020-07-30 NOTE — TELEPHONE ENCOUNTER
Samara does not require an authorization at this time per fax from [employer] P.T letter faxed to [facility] [employer] with fax confirmation received.

## 2020-07-30 NOTE — TELEPHONE ENCOUNTER
Due to change of insurance, new PA submitted on CMM for Aubagio., Key: [id number]. Copy of insurance card given to JONI PETERSON to update in [facility].

## 2020-07-30 NOTE — TELEPHONE ENCOUNTER
Spoke with Destiny Jordan at [facility] to One who states Rx Aubagio 14mg #30 to be faxed to [facility] to cover patient until new insurance approval received.  MyChart message sent to patient to call or send [employer] message with new insurance info so PA request can

## 2020-07-30 NOTE — TELEPHONE ENCOUNTER
From: Diya Chowdhury  To: Flaquita Curran MD  Sent: 7/28/2020 4:34 PM CDT  Subject: Prescription Question    I just requested an appointment with the dr. I got new insurance and I’m low on medication.  The MS program asked for a faxed cover sheet RX from the dr. Arlene Jesus

## 2020-07-30 NOTE — TELEPHONE ENCOUNTER
Rx Aubagio 14mg #30 faxed to [facility] with fax confirmation received. [No Acute Distress] : no acute distress [Well Nourished] : well nourished [Well Developed] : well developed [Well-Appearing] : well-appearing [Normal Sclera/Conjunctiva] : normal sclera/conjunctiva [PERRL] : pupils equal round and reactive to light [EOMI] : extraocular movements intact [Normal Outer Ear/Nose] : the outer ears and nose were normal in appearance [Normal Oropharynx] : the oropharynx was normal [No JVD] : no jugular venous distention [Supple] : supple [No Lymphadenopathy] : no lymphadenopathy [Thyroid Normal, No Nodules] : the thyroid was normal and there were no nodules present [No Respiratory Distress] : no respiratory distress  [Clear to Auscultation] : lungs were clear to auscultation bilaterally [No Accessory Muscle Use] : no accessory muscle use [Normal Rate] : normal rate  [Regular Rhythm] : with a regular rhythm [Normal S1, S2] : normal S1 and S2 [No Murmur] : no murmur heard [No Carotid Bruits] : no carotid bruits [No Abdominal Bruit] : a ~M bruit was not heard ~T in the abdomen [No Varicosities] : no varicosities [Pedal Pulses Present] : the pedal pulses are present [No Edema] : there was no peripheral edema [No Extremity Clubbing/Cyanosis] : no extremity clubbing/cyanosis [No Palpable Aorta] : no palpable aorta [Normal Posterior Cervical Nodes] : no posterior cervical lymphadenopathy [Normal Anterior Cervical Nodes] : no anterior cervical lymphadenopathy [No CVA Tenderness] : no CVA  tenderness [No Spinal Tenderness] : no spinal tenderness [No Joint Swelling] : no joint swelling [Grossly Normal Strength/Tone] : grossly normal strength/tone [No Rash] : no rash [Normal Gait] : normal gait [Coordination Grossly Intact] : coordination grossly intact [No Focal Deficits] : no focal deficits [Deep Tendon Reflexes (DTR)] : deep tendon reflexes were 2+ and symmetric [Normal Affect] : the affect was normal [Normal Insight/Judgement] : insight and judgment were intact [de-identified] : Thick callouses on palms and fingers. no visible puncture site

## 2020-08-03 ENCOUNTER — OFFICE VISIT (OUTPATIENT)
Dept: NEUROLOGY | Facility: CLINIC | Age: 40
End: 2020-08-03
Payer: COMMERCIAL

## 2020-08-03 VITALS
SYSTOLIC BLOOD PRESSURE: 128 MMHG | WEIGHT: 191 LBS | BODY MASS INDEX: 31 KG/M2 | DIASTOLIC BLOOD PRESSURE: 72 MMHG | HEART RATE: 78 BPM | RESPIRATION RATE: 16 BRPM

## 2020-08-03 DIAGNOSIS — M25.559 PAIN IN JOINT INVOLVING PELVIC REGION AND THIGH, UNSPECIFIED LATERALITY: ICD-10-CM

## 2020-08-03 DIAGNOSIS — G35 MULTIPLE SCLEROSIS (HCC): ICD-10-CM

## 2020-08-03 DIAGNOSIS — R20.0 NUMBNESS AND TINGLING OF FOOT: ICD-10-CM

## 2020-08-03 DIAGNOSIS — R53.82 CHRONIC FATIGUE: Primary | ICD-10-CM

## 2020-08-03 DIAGNOSIS — R00.2 PALPITATIONS: ICD-10-CM

## 2020-08-03 DIAGNOSIS — G62.9 SENSORY NEUROPATHY: ICD-10-CM

## 2020-08-03 DIAGNOSIS — R20.9 DISTURBANCE OF SKIN SENSATION: ICD-10-CM

## 2020-08-03 DIAGNOSIS — R20.2 NUMBNESS AND TINGLING OF FOOT: ICD-10-CM

## 2020-08-03 PROCEDURE — 3074F SYST BP LT 130 MM HG: CPT | Performed by: OTHER

## 2020-08-03 PROCEDURE — 99215 OFFICE O/P EST HI 40 MIN: CPT | Performed by: OTHER

## 2020-08-03 PROCEDURE — 3078F DIAST BP <80 MM HG: CPT | Performed by: OTHER

## 2020-08-03 RX ORDER — CHOLECALCIFEROL (VITAMIN D3) 25 MCG
1 TABLET,CHEWABLE ORAL DAILY
COMMUNITY
End: 2021-11-02

## 2020-08-03 RX ORDER — MULTIVIT-MIN/IRON/FOLIC ACID/K 18-600-40
1 CAPSULE ORAL DAILY
COMMUNITY

## 2020-08-03 RX ORDER — GABAPENTIN 300 MG/1
300 CAPSULE ORAL 3 TIMES DAILY
Qty: 270 CAPSULE | Refills: 3 | Status: SHIPPED | OUTPATIENT
Start: 2020-08-03 | End: 2021-09-09

## 2020-08-03 RX ORDER — ALPRAZOLAM 0.25 MG/1
0.25 TABLET ORAL 2 TIMES DAILY PRN
Qty: 30 TABLET | Refills: 0 | Status: SHIPPED | OUTPATIENT
Start: 2020-08-03 | End: 2021-11-02

## 2020-08-03 RX ORDER — GLUCOSAMINE/D3/BOSWELLIA SERRA 1500MG-400
1 TABLET ORAL DAILY
COMMUNITY

## 2020-08-03 NOTE — PROGRESS NOTES
The patient states pressure in the back of the head. Patient was in the ER on 07/26/2020 for left arm pain. Patient states her pulse increase in the little activity.  Patient states she is more emotional than normal.  Left side of the body has more pain davin

## 2020-08-03 NOTE — PROGRESS NOTES
[facility] in [place] with [facility]  Neurology - Clinic Follow up  8/3/2020    Sameer Calle Patient Status:  No patient class for patient encounter    1980 MRN [id number]   Location @ Procedure Laterality Date   • ESOPHAGOGASTRODUODENOSCOPY, COLONOSCOPY, POSSIBLE BIOPSY, POSSIBLE POLYPECTOMY 00225, 24481 N/A 3/29/2011    Performed by Tayler Velazquez MD at [address] [place] [address]:  No Known Allergies    MEDICATIONS: EMR re bruits  Cardiac: S1/S2 RRR  Lungs: CTA B/L  Extremities:  no pitting edema, no cyanosis, pulse is present,    Neurologic Examination  Mental Status  Is intact for age, and Language is intact, no slurred speech;  CN is normal from II to XII, ear exam is nor (NEURONTIN) 300 MG Oral Cap            Impression/Plan:  (R53.82) Chronic fatigue  (primary encounter diagnosis)    (G62.9) Sensory neuropathy  Plan: gabapentin (NEURONTIN) 300 MG Oral Cap    (G35) Multiple sclerosis ([facility]  Plan: MRI BRAIN (W+WO) (CPT=7028 Edson  8/3/2020

## 2020-08-15 ENCOUNTER — PATIENT MESSAGE (OUTPATIENT)
Dept: NEUROLOGY | Facility: CLINIC | Age: 40
End: 2020-08-15

## 2020-08-17 NOTE — TELEPHONE ENCOUNTER
From: Austin Finley  To:  Noemy King MD  Sent: 8/15/2020 9:38 AM CDT  Subject: Non-Urgent Medical Question    Can I please get the 3 MRI request sent to   [facility] their fax # is [phone]    Thanks

## 2020-08-17 NOTE — TELEPHONE ENCOUNTER
Spoke with Susie Emmonak at [address] to [facility]. Updated on [facility]       MRI Orders and Authorization faxed to [facility] to Fax #:[phone]; Confirmation Received.

## 2020-09-09 ENCOUNTER — TELEPHONE (OUTPATIENT)
Dept: NEUROLOGY | Facility: CLINIC | Age: 40
End: 2020-09-09

## 2020-09-10 ENCOUNTER — TELEPHONE (OUTPATIENT)
Dept: NEUROLOGY | Facility: CLINIC | Age: 40
End: 2020-09-10

## 2020-10-05 NOTE — TELEPHONE ENCOUNTER
Message left on VM (ok per HIPAA consent) that MRI stable and Dr Isaac Ricketts will review CD at 10/20 OV.

## 2020-10-20 ENCOUNTER — OFFICE VISIT (OUTPATIENT)
Dept: NEUROLOGY | Facility: CLINIC | Age: 40
End: 2020-10-20
Payer: COMMERCIAL

## 2020-10-20 VITALS
SYSTOLIC BLOOD PRESSURE: 124 MMHG | HEART RATE: 86 BPM | RESPIRATION RATE: 12 BRPM | BODY MASS INDEX: 31 KG/M2 | DIASTOLIC BLOOD PRESSURE: 78 MMHG | WEIGHT: 194 LBS

## 2020-10-20 DIAGNOSIS — R53.82 CHRONIC FATIGUE: ICD-10-CM

## 2020-10-20 DIAGNOSIS — R20.0 NUMBNESS AND TINGLING OF FOOT: ICD-10-CM

## 2020-10-20 DIAGNOSIS — R20.2 NUMBNESS AND TINGLING OF FOOT: ICD-10-CM

## 2020-10-20 DIAGNOSIS — G35 MULTIPLE SCLEROSIS EXACERBATION (HCC): ICD-10-CM

## 2020-10-20 DIAGNOSIS — G35 MULTIPLE SCLEROSIS (HCC): ICD-10-CM

## 2020-10-20 DIAGNOSIS — H81.10 BENIGN PAROXYSMAL POSITIONAL VERTIGO, UNSPECIFIED LATERALITY: Primary | ICD-10-CM

## 2020-10-20 DIAGNOSIS — R00.2 PALPITATIONS: ICD-10-CM

## 2020-10-20 PROCEDURE — 99214 OFFICE O/P EST MOD 30 MIN: CPT | Performed by: OTHER

## 2020-10-20 PROCEDURE — 3074F SYST BP LT 130 MM HG: CPT | Performed by: OTHER

## 2020-10-20 PROCEDURE — 3078F DIAST BP <80 MM HG: CPT | Performed by: OTHER

## 2020-10-20 RX ORDER — RENAGEL 800 MG/1
14 TABLET ORAL DAILY
Qty: 30 TABLET | Refills: 11 | Status: SHIPPED | OUTPATIENT
Start: 2020-10-20 | End: 2020-11-19

## 2020-10-20 NOTE — PROGRESS NOTES
[facility] [place] with [facility]  Neurology - Clinic Follow up  10/20/2020    Tayler Merritt Patient Status:  No patient class for patient encounter    1980 MRN [id number]   Location fracture, she is on brace last two weeks.        PAST MEDICAL HISTORY:  Past Medical History:   Diagnosis Date   • Anxiety state, unspecified    • KIDNEY STONE    • Multiple sclerosis ([facility]    • OTHER DISEASES     ovarian cysts       PAST SURGICAL HISTORY: Musculoskeletal:  NO current complaint,  All other systems negative      Objective:  Blood pressure 124/78, pulse 86, resp. rate 12, weight 194 lb (88 kg). Body mass index is 31.31 kg/m².     Physical Exam:  General Exam:  Appearance: well developed,  nu benign positional - Primary            Impression/Plan:  (H81.10) Benign paroxysmal positional vertigo, unspecified laterality  (primary encounter diagnosis)    (R00.2) Palpitations    (R20.0,  R20.2) Numbness and tingling of foot  Plan: CBC WITH DIFFERENT

## 2020-10-20 NOTE — PROGRESS NOTES
Pt here for MS follow up after MRI. Pt states she doesn't necessarily notice changes since last visit, but notices changes in anxiety and emotion, finds self jumping to worst case scenario as well as some headaches.

## 2021-01-24 LAB
ABSOLUTE BASOPHILS: 49 CELLS/UL (ref 0–200)
ABSOLUTE EOSINOPHILS: 182 CELLS/UL (ref 15–500)
ABSOLUTE LYMPHOCYTES: 1120 CELLS/UL (ref 850–3900)
ABSOLUTE MONOCYTES: 651 CELLS/UL (ref 200–950)
ABSOLUTE NEUTROPHILS: 4998 CELLS/UL (ref 1500–7800)
ALBUMIN/GLOBULIN RATIO: 1.3 (CALC) (ref 1–2.5)
ALBUMIN: 3.5 G/DL (ref 3.6–5.1)
ALKALINE PHOSPHATASE: 53 U/L (ref 31–125)
ALT: 9 U/L (ref 6–29)
AST: 12 U/L (ref 10–30)
BASOPHILS: 0.7 %
BILIRUBIN, TOTAL: 0.5 MG/DL (ref 0.2–1.2)
BUN: 11 MG/DL (ref 7–25)
CALCIUM: 8.6 MG/DL (ref 8.6–10.2)
CARBON DIOXIDE: 22 MMOL/L (ref 20–32)
CHLORIDE: 107 MMOL/L (ref 98–110)
CREATININE: 0.76 MG/DL (ref 0.5–1.1)
EGFR IF AFRICN AM: 113 ML/MIN/1.73M2
EGFR IF NONAFRICN AM: 97 ML/MIN/1.73M2
EOSINOPHILS: 2.6 %
GLOBULIN: 2.8 G/DL (CALC) (ref 1.9–3.7)
GLUCOSE: 95 MG/DL (ref 65–99)
HEMATOCRIT: 37.5 % (ref 35–45)
HEMOGLOBIN: 12.4 G/DL (ref 11.7–15.5)
LYMPHOCYTES: 16 %
MCH: 30 PG (ref 27–33)
MCHC: 33.1 G/DL (ref 32–36)
MCV: 90.8 FL (ref 80–100)
MONOCYTES: 9.3 %
MPV: 10.8 FL (ref 7.5–12.5)
NEUTROPHILS: 71.4 %
PLATELET COUNT: 303 THOUSAND/UL (ref 140–400)
POTASSIUM: 4.1 MMOL/L (ref 3.5–5.3)
PROTEIN, TOTAL: 6.3 G/DL (ref 6.1–8.1)
RDW: 12.5 % (ref 11–15)
RED BLOOD CELL COUNT: 4.13 MILLION/UL (ref 3.8–5.1)
SODIUM: 139 MMOL/L (ref 135–146)
VITAMIN D, 25-OH, TOTAL: 69 NG/ML (ref 30–100)
WHITE BLOOD CELL COUNT: 7 THOUSAND/UL (ref 3.8–10.8)

## 2021-01-25 ENCOUNTER — OFFICE VISIT (OUTPATIENT)
Dept: OBGYN | Age: 41
End: 2021-01-25

## 2021-01-25 VITALS
HEART RATE: 76 BPM | HEIGHT: 66 IN | BODY MASS INDEX: 34.86 KG/M2 | SYSTOLIC BLOOD PRESSURE: 120 MMHG | TEMPERATURE: 98.1 F | DIASTOLIC BLOOD PRESSURE: 76 MMHG | WEIGHT: 216.9 LBS

## 2021-01-25 DIAGNOSIS — Z12.31 ENCOUNTER FOR SCREENING MAMMOGRAM FOR MALIGNANT NEOPLASM OF BREAST: ICD-10-CM

## 2021-01-25 PROCEDURE — 99396 PREV VISIT EST AGE 40-64: CPT | Performed by: OBSTETRICS & GYNECOLOGY

## 2021-01-25 RX ORDER — LEVONORGESTREL AND ETHINYL ESTRADIOL 0.15-0.03
1 KIT ORAL DAILY
Qty: 84 TABLET | Refills: 4 | Status: SHIPPED | OUTPATIENT
Start: 2021-01-25 | End: 2022-06-10

## 2021-01-25 RX ORDER — GABAPENTIN 300 MG/1
CAPSULE ORAL
COMMUNITY
Start: 2020-10-30

## 2021-01-25 RX ORDER — PHENOL 1.4 %
1 AEROSOL, SPRAY (ML) MUCOUS MEMBRANE
COMMUNITY

## 2021-01-25 RX ORDER — CHOLECALCIFEROL (VITAMIN D3) 25 MCG
1 TABLET,CHEWABLE ORAL
COMMUNITY
End: 2022-09-20 | Stop reason: ALTCHOICE

## 2021-03-06 ENCOUNTER — HOSPITAL ENCOUNTER (OUTPATIENT)
Dept: MAMMOGRAPHY | Age: 41
Discharge: HOME | End: 2021-03-06
Attending: OBSTETRICS & GYNECOLOGY

## 2021-03-06 DIAGNOSIS — Z12.31 ENCOUNTER FOR SCREENING MAMMOGRAM FOR MALIGNANT NEOPLASM OF BREAST: ICD-10-CM

## 2021-03-06 PROCEDURE — 77063 BREAST TOMOSYNTHESIS BI: CPT

## 2021-04-09 DIAGNOSIS — Z23 NEED FOR VACCINATION: ICD-10-CM

## 2021-09-09 DIAGNOSIS — G62.9 SENSORY NEUROPATHY: ICD-10-CM

## 2021-09-09 DIAGNOSIS — R20.0 NUMBNESS AND TINGLING OF FOOT: ICD-10-CM

## 2021-09-09 DIAGNOSIS — R20.9 DISTURBANCE OF SKIN SENSATION: ICD-10-CM

## 2021-09-09 DIAGNOSIS — M25.559 PAIN IN JOINT INVOLVING PELVIC REGION AND THIGH, UNSPECIFIED LATERALITY: ICD-10-CM

## 2021-09-09 DIAGNOSIS — R20.2 NUMBNESS AND TINGLING OF FOOT: ICD-10-CM

## 2021-09-09 RX ORDER — GABAPENTIN 300 MG/1
CAPSULE ORAL
Qty: 270 CAPSULE | Refills: 0 | Status: SHIPPED | OUTPATIENT
Start: 2021-09-09 | End: 2021-11-02

## 2021-09-09 NOTE — TELEPHONE ENCOUNTER
Sent the patient a [employer] message to make tal appt for further medication refills.      Medication: GABAPENTIN 300 MG Oral Cap    Date of last refill: 08/03/2020 (#270/3)  [place]    Last office visit: 10/20/2020  Du

## 2021-10-04 ENCOUNTER — PATIENT MESSAGE (OUTPATIENT)
Dept: NEUROLOGY | Facility: CLINIC | Age: 41
End: 2021-10-04

## 2021-10-04 DIAGNOSIS — G35 MULTIPLE SCLEROSIS EXACERBATION (HCC): Primary | ICD-10-CM

## 2021-10-04 RX ORDER — RENAGEL 800 MG/1
14 TABLET ORAL DAILY
Qty: 30 TABLET | Refills: 1 | Status: SHIPPED | OUTPATIENT
Start: 2021-10-04 | End: 2021-11-02

## 2021-10-04 NOTE — TELEPHONE ENCOUNTER
Medication: aubagio    Date of last refill: 10/20/20 (#30/11)  [place]    Last office visit: 10/20/20  Due back to clinic per last office note:  6 mo  Date next office visit scheduled:    Future Appointments   Date Ti

## 2021-10-04 NOTE — TELEPHONE ENCOUNTER
From: Charo Jones  To: Luz Cheng MD  Sent: 10/4/2021 10:21 AM CDT  Subject: Lydia Check refills     [facility] Dr. Natanael Mendosa,   I made an appointment to see you on Nov 2nd. I finally got the foot surgery on the left foot I needed.    My insurance changed the company the

## 2021-11-02 ENCOUNTER — OFFICE VISIT (OUTPATIENT)
Dept: NEUROLOGY | Facility: CLINIC | Age: 41
End: 2021-11-02
Payer: COMMERCIAL

## 2021-11-02 VITALS
HEART RATE: 68 BPM | RESPIRATION RATE: 16 BRPM | WEIGHT: 210 LBS | BODY MASS INDEX: 34 KG/M2 | SYSTOLIC BLOOD PRESSURE: 118 MMHG | DIASTOLIC BLOOD PRESSURE: 68 MMHG

## 2021-11-02 DIAGNOSIS — G35 MULTIPLE SCLEROSIS EXACERBATION (HCC): ICD-10-CM

## 2021-11-02 DIAGNOSIS — M25.559 PAIN IN JOINT INVOLVING PELVIC REGION AND THIGH, UNSPECIFIED LATERALITY: ICD-10-CM

## 2021-11-02 DIAGNOSIS — R53.82 CHRONIC FATIGUE: Primary | ICD-10-CM

## 2021-11-02 DIAGNOSIS — R20.9 DISTURBANCE OF SKIN SENSATION: ICD-10-CM

## 2021-11-02 DIAGNOSIS — G62.9 SENSORY NEUROPATHY: ICD-10-CM

## 2021-11-02 DIAGNOSIS — R20.0 NUMBNESS AND TINGLING OF FOOT: ICD-10-CM

## 2021-11-02 DIAGNOSIS — F41.9 ANXIETY: ICD-10-CM

## 2021-11-02 DIAGNOSIS — R20.2 NUMBNESS AND TINGLING OF FOOT: ICD-10-CM

## 2021-11-02 PROCEDURE — 3074F SYST BP LT 130 MM HG: CPT | Performed by: OTHER

## 2021-11-02 PROCEDURE — 3078F DIAST BP <80 MM HG: CPT | Performed by: OTHER

## 2021-11-02 PROCEDURE — 99214 OFFICE O/P EST MOD 30 MIN: CPT | Performed by: OTHER

## 2021-11-02 RX ORDER — GABAPENTIN 300 MG/1
600 CAPSULE ORAL 2 TIMES DAILY
Qty: 360 CAPSULE | Refills: 3 | Status: SHIPPED | OUTPATIENT
Start: 2021-11-02

## 2021-11-02 RX ORDER — RENAGEL 800 MG/1
14 TABLET ORAL DAILY
Qty: 30 TABLET | Refills: 11 | Status: SHIPPED | OUTPATIENT
Start: 2021-11-02 | End: 2021-12-02

## 2021-11-02 RX ORDER — RENAGEL 800 MG/1
14 TABLET ORAL DAILY
Qty: 30 TABLET | Refills: 11 | Status: SHIPPED | OUTPATIENT
Start: 2021-11-02 | End: 2021-11-02

## 2021-11-02 NOTE — PROGRESS NOTES
[facility] [place] [facility]  Neurology - Clinic Follow up  2021    Monroe Mujica Patient Status:  No patient class for patient encounter    1980 MRN [id number]   Location SURGICAL HISTORY:  Past Surgical History:   Procedure Laterality Date   • BUNIONECTOMY Left 09/03/2021   • COLONOSCOPY,BIOPSY  3/29/2011    Performed by Negro Mcmahon at [address] [place] [address]   • UPPER GI ENDOSCOPY,BIOPSY  3/29/2011    Performed by Al Lloyd normal,   Motor:  NO drift. EMIGDIO intact, normal tone, normal strength, weakness in L.  Great toe dorsal extension, eversion, 4/5, FE 4/5, same as  last exam, she is on brace left foot,   Sensory: decreased  Light touch, pin to 1/3 calf level,  and wrist leve 300 MG Oral Cap    (G62.9) Sensory neuropathy  Plan: gabapentin 300 MG Oral Cap    (R20.9) Disturbance of skin sensation  Plan: gabapentin 300 MG Oral Cap    (R20.0,  R20.2) Numbness and tingling of foot  Plan: gabapentin 300 MG Oral Cap    (G35) Multiple

## 2021-11-03 ENCOUNTER — TELEPHONE (OUTPATIENT)
Dept: NEUROLOGY | Facility: CLINIC | Age: 41
End: 2021-11-03

## 2021-11-03 NOTE — TELEPHONE ENCOUNTER
Received fax from [facility] requesting current insurance info as they have been having problems processing Aubagio refill. Copy of Insurance Card and face sheet faxed to [facility] with fax confirmation received.

## 2021-12-02 ENCOUNTER — TELEPHONE (OUTPATIENT)
Dept: NEUROLOGY | Facility: CLINIC | Age: 41
End: 2021-12-02

## 2021-12-02 NOTE — TELEPHONE ENCOUNTER
rec'd MRI brain and cervical spine results from [address] dated 12/1/21; placed in provider folder for review

## 2021-12-06 ENCOUNTER — TELEPHONE (OUTPATIENT)
Dept: NEUROLOGY | Facility: CLINIC | Age: 41
End: 2021-12-06

## 2021-12-06 NOTE — TELEPHONE ENCOUNTER
Rec'd MRI [place] MARISCAL ww/o Contrast from [facility], dated on 12/6/21; placed in providers folder for review

## 2021-12-09 DIAGNOSIS — G62.9 SENSORY NEUROPATHY: ICD-10-CM

## 2021-12-09 DIAGNOSIS — R20.0 NUMBNESS AND TINGLING OF FOOT: ICD-10-CM

## 2021-12-09 DIAGNOSIS — R20.2 NUMBNESS AND TINGLING OF FOOT: ICD-10-CM

## 2021-12-09 DIAGNOSIS — M25.559 PAIN IN JOINT INVOLVING PELVIC REGION AND THIGH, UNSPECIFIED LATERALITY: ICD-10-CM

## 2021-12-09 DIAGNOSIS — R20.9 DISTURBANCE OF SKIN SENSATION: ICD-10-CM

## 2021-12-10 RX ORDER — GABAPENTIN 300 MG/1
CAPSULE ORAL
Qty: 270 CAPSULE | Refills: 0 | OUTPATIENT
Start: 2021-12-10

## 2021-12-15 RX ORDER — FLUCONAZOLE 150 MG/1
150 TABLET ORAL ONCE
Qty: 1 TABLET | Refills: 0 | Status: SHIPPED | OUTPATIENT
Start: 2021-12-15 | End: 2021-12-15

## 2021-12-17 RX ORDER — RENAGEL 800 MG/1
TABLET ORAL
Qty: 30 TABLET | Refills: 0 | OUTPATIENT
Start: 2021-12-17

## 2022-05-05 ENCOUNTER — TELEPHONE (OUTPATIENT)
Dept: NEUROLOGY | Facility: CLINIC | Age: 42
End: 2022-05-05

## 2022-05-05 RX ORDER — RENAGEL 800 MG/1
14 TABLET ORAL DAILY
Qty: 30 TABLET | Refills: 7 | Status: SHIPPED | OUTPATIENT
Start: 2022-05-05

## 2022-05-05 NOTE — TELEPHONE ENCOUNTER
Pt called to give new ins info, same ID# but group # is [id number], pt will either fax copy of card or upload, new plan is eff 5/6/2022.

## 2022-05-05 NOTE — TELEPHONE ENCOUNTER
Have completed PA form for [employer]. Will fax tomorrow 5/6/22, as patient stated her coverage is not effective until then.

## 2022-05-06 NOTE — TELEPHONE ENCOUNTER
Completed PA form faxed to [employer], along with OV notes from 11/2/21 visit, MRI results from [facility] from [address] 12/1/21 and 12/3/21, and CMP results from 12/5/21. Fax confirmation received. Awaiting determination.

## 2022-05-09 NOTE — TELEPHONE ENCOUNTER
Received call from [employer] [place], [address], who states they received a PA request for Aimovig but it was on MS form and indicated tablets. [facility] [employer] [place] inquiring if it is for [employer]. Verbal order given over phone to [facility] [employer] [place] to change the medication on the PA form to  Aubagio 14mg tablets.

## 2022-06-10 RX ORDER — LEVONORGESTREL AND ETHINYL ESTRADIOL 0.15-0.03
KIT ORAL
Qty: 91 TABLET | Refills: 0 | Status: SHIPPED | OUTPATIENT
Start: 2022-06-10 | End: 2022-09-14

## 2022-06-29 ENCOUNTER — PATIENT MESSAGE (OUTPATIENT)
Dept: NEUROLOGY | Facility: CLINIC | Age: 42
End: 2022-06-29

## 2022-08-25 ENCOUNTER — APPOINTMENT (OUTPATIENT)
Dept: OBGYN | Age: 42
End: 2022-08-25

## 2022-09-13 ENCOUNTER — TELEPHONE (OUTPATIENT)
Dept: OBGYN | Age: 42
End: 2022-09-13

## 2022-09-14 RX ORDER — LEVONORGESTREL AND ETHINYL ESTRADIOL 0.15-0.03
KIT ORAL
Qty: 91 TABLET | Refills: 0 | Status: SHIPPED | OUTPATIENT
Start: 2022-09-14 | End: 2022-09-20 | Stop reason: SDUPTHER

## 2022-09-20 ENCOUNTER — OFFICE VISIT (OUTPATIENT)
Dept: OBGYN | Age: 42
End: 2022-09-20

## 2022-09-20 VITALS
DIASTOLIC BLOOD PRESSURE: 63 MMHG | SYSTOLIC BLOOD PRESSURE: 128 MMHG | HEART RATE: 74 BPM | HEIGHT: 66 IN | WEIGHT: 223 LBS | BODY MASS INDEX: 35.84 KG/M2

## 2022-09-20 DIAGNOSIS — Z01.419 GYNECOLOGIC EXAM NORMAL: Primary | ICD-10-CM

## 2022-09-20 PROCEDURE — 99396 PREV VISIT EST AGE 40-64: CPT | Performed by: OBSTETRICS & GYNECOLOGY

## 2022-09-20 RX ORDER — LEVONORGESTREL AND ETHINYL ESTRADIOL 0.15-0.03
1 KIT ORAL DAILY
Qty: 91 TABLET | Refills: 4 | Status: SHIPPED | OUTPATIENT
Start: 2022-09-20

## 2022-09-20 RX ORDER — OMEPRAZOLE 20 MG/1
20 CAPSULE, DELAYED RELEASE ORAL DAILY
COMMUNITY
Start: 2022-08-28

## 2022-09-20 ASSESSMENT — PATIENT HEALTH QUESTIONNAIRE - PHQ9
SUM OF ALL RESPONSES TO PHQ9 QUESTIONS 1 AND 2: 0
1. LITTLE INTEREST OR PLEASURE IN DOING THINGS: NOT AT ALL
SUM OF ALL RESPONSES TO PHQ9 QUESTIONS 1 AND 2: 0
2. FEELING DOWN, DEPRESSED OR HOPELESS: NOT AT ALL
CLINICAL INTERPRETATION OF PHQ2 SCORE: NO FURTHER SCREENING NEEDED

## 2022-10-19 ENCOUNTER — TELEPHONE (OUTPATIENT)
Dept: NEUROLOGY | Facility: CLINIC | Age: 42
End: 2022-10-19

## 2022-10-19 ENCOUNTER — OFFICE VISIT (OUTPATIENT)
Dept: NEUROLOGY | Facility: CLINIC | Age: 42
End: 2022-10-19
Payer: COMMERCIAL

## 2022-10-19 VITALS
SYSTOLIC BLOOD PRESSURE: 130 MMHG | DIASTOLIC BLOOD PRESSURE: 67 MMHG | HEART RATE: 79 BPM | HEIGHT: 66 IN | WEIGHT: 210 LBS | BODY MASS INDEX: 33.75 KG/M2

## 2022-10-19 DIAGNOSIS — G35 MULTIPLE SCLEROSIS (HCC): Primary | ICD-10-CM

## 2022-10-19 PROCEDURE — 3075F SYST BP GE 130 - 139MM HG: CPT | Performed by: OTHER

## 2022-10-19 PROCEDURE — 3078F DIAST BP <80 MM HG: CPT | Performed by: OTHER

## 2022-10-19 PROCEDURE — 3008F BODY MASS INDEX DOCD: CPT | Performed by: OTHER

## 2022-10-19 PROCEDURE — 99214 OFFICE O/P EST MOD 30 MIN: CPT | Performed by: OTHER

## 2022-10-19 NOTE — TELEPHONE ENCOUNTER
Pt presented to office with two MRI disks from [address] [place] [address]. DOS 12/3/21 MRI spine     DOS 12/1/21 MRI cervical spine and brain. Uploaded with [employer]. Please review.

## 2022-11-14 DIAGNOSIS — R20.9 DISTURBANCE OF SKIN SENSATION: ICD-10-CM

## 2022-11-14 DIAGNOSIS — R20.0 NUMBNESS AND TINGLING OF FOOT: ICD-10-CM

## 2022-11-14 DIAGNOSIS — G62.9 SENSORY NEUROPATHY: ICD-10-CM

## 2022-11-14 DIAGNOSIS — M25.559 PAIN IN JOINT INVOLVING PELVIC REGION AND THIGH, UNSPECIFIED LATERALITY: ICD-10-CM

## 2022-11-14 DIAGNOSIS — R20.2 NUMBNESS AND TINGLING OF FOOT: ICD-10-CM

## 2022-11-14 DIAGNOSIS — G35 MULTIPLE SCLEROSIS (HCC): ICD-10-CM

## 2022-11-14 RX ORDER — RENAGEL 800 MG/1
14 TABLET ORAL DAILY
Qty: 30 TABLET | Refills: 7 | Status: SHIPPED | OUTPATIENT
Start: 2022-11-14

## 2022-11-14 RX ORDER — GABAPENTIN 300 MG/1
600 CAPSULE ORAL 2 TIMES DAILY
Qty: 360 CAPSULE | Refills: 3 | Status: SHIPPED | OUTPATIENT
Start: 2022-11-14

## 2022-11-14 NOTE — TELEPHONE ENCOUNTER
Medication: Teriflunomide (AUBAGIO) 14 MG Oral Tab     Date of last refill: 5/5/2022 (#30/7)  [place]     Last office visit: 10/19/2022  Due back to clinic per last office note:    Date next office visit scheduled:    No future appointments.         Last OV note recommendation:

## 2022-11-14 NOTE — TELEPHONE ENCOUNTER
Medication: gabapentin 300 MG Oral Cap     Date of last refill:11/2/2021 (#360/3)  [place] 9/19/6529     Last office visit: 10/19/2022  Due back to clinic per last office note:  6 months  Date next office visit scheduled:    No future appointments.         Last OV note recommendation:

## 2023-01-07 ENCOUNTER — HOSPITAL ENCOUNTER (EMERGENCY)
Age: 43
Discharge: HOME | End: 2023-01-07
Attending: EMERGENCY MEDICINE

## 2023-01-07 ENCOUNTER — APPOINTMENT (OUTPATIENT)
Dept: GENERAL RADIOLOGY | Age: 43
End: 2023-01-07
Attending: PHYSICIAN ASSISTANT

## 2023-01-07 ENCOUNTER — APPOINTMENT (OUTPATIENT)
Dept: CT IMAGING | Age: 43
End: 2023-01-07
Attending: EMERGENCY MEDICINE

## 2023-01-07 VITALS
HEIGHT: 66 IN | TEMPERATURE: 98.2 F | DIASTOLIC BLOOD PRESSURE: 77 MMHG | WEIGHT: 217 LBS | SYSTOLIC BLOOD PRESSURE: 129 MMHG | OXYGEN SATURATION: 98 % | HEART RATE: 76 BPM | RESPIRATION RATE: 15 BRPM | BODY MASS INDEX: 34.87 KG/M2

## 2023-01-07 DIAGNOSIS — R07.89 CHEST PAIN, LOCALIZED: Primary | ICD-10-CM

## 2023-01-07 LAB
ALBUMIN SERPL-MCNC: 3.1 G/DL (ref 3.6–5.1)
ALBUMIN/GLOB SERPL: 0.8 {RATIO} (ref 1–2.4)
ALP SERPL-CCNC: 68 UNITS/L (ref 45–117)
ALT SERPL-CCNC: 20 UNITS/L
ANION GAP SERPL CALC-SCNC: 10 MMOL/L (ref 7–19)
APPEARANCE UR: CLEAR
AST SERPL-CCNC: 15 UNITS/L
BACTERIA #/AREA URNS HPF: ABNORMAL /HPF
BASOPHILS # BLD: 0.1 K/MCL (ref 0–0.3)
BASOPHILS NFR BLD: 1 %
BILIRUB SERPL-MCNC: 0.5 MG/DL (ref 0.2–1)
BILIRUB UR QL STRIP: NEGATIVE
BUN SERPL-MCNC: 8 MG/DL (ref 6–20)
BUN/CREAT SERPL: 11 (ref 7–25)
CALCIUM SERPL-MCNC: 8.8 MG/DL (ref 8.4–10.2)
CHLORIDE SERPL-SCNC: 108 MMOL/L (ref 97–110)
CO2 SERPL-SCNC: 23 MMOL/L (ref 21–32)
COLOR UR: YELLOW
CREAT SERPL-MCNC: 0.76 MG/DL (ref 0.51–0.95)
D DIMER PPP FEU-MCNC: 0.93 MG/L (FEU)
DEPRECATED RDW RBC: 45.1 FL (ref 39–50)
EOSINOPHIL # BLD: 0.1 K/MCL (ref 0–0.5)
EOSINOPHIL NFR BLD: 1 %
ERYTHROCYTE [DISTWIDTH] IN BLOOD: 13.4 % (ref 11–15)
FASTING DURATION TIME PATIENT: ABNORMAL H
GFR SERPLBLD BASED ON 1.73 SQ M-ARVRAT: >90 ML/MIN
GLOBULIN SER-MCNC: 3.8 G/DL (ref 2–4)
GLUCOSE SERPL-MCNC: 85 MG/DL (ref 70–99)
GLUCOSE UR STRIP-MCNC: NEGATIVE MG/DL
HCG UR QL: NEGATIVE
HCT VFR BLD CALC: 38.5 % (ref 36–46.5)
HGB BLD-MCNC: 12.5 G/DL (ref 12–15.5)
HGB UR QL STRIP: NEGATIVE
HYALINE CASTS #/AREA URNS LPF: ABNORMAL /LPF
IMM GRANULOCYTES # BLD AUTO: 0 K/MCL (ref 0–0.2)
IMM GRANULOCYTES # BLD: 0 %
KETONES UR STRIP-MCNC: >80 MG/DL
LEUKOCYTE ESTERASE UR QL STRIP: NEGATIVE
LIPASE SERPL-CCNC: 104 UNITS/L (ref 73–393)
LYMPHOCYTES # BLD: 1.2 K/MCL (ref 1–4.8)
LYMPHOCYTES NFR BLD: 16 %
MAGNESIUM SERPL-MCNC: 1.8 MG/DL (ref 1.7–2.4)
MCH RBC QN AUTO: 29.6 PG (ref 26–34)
MCHC RBC AUTO-ENTMCNC: 32.5 G/DL (ref 32–36.5)
MCV RBC AUTO: 91 FL (ref 78–100)
MONOCYTES # BLD: 0.7 K/MCL (ref 0.3–0.9)
MONOCYTES NFR BLD: 10 %
MUCOUS THREADS URNS QL MICRO: PRESENT
NEUTROPHILS # BLD: 5.7 K/MCL (ref 1.8–7.7)
NEUTROPHILS NFR BLD: 72 %
NITRITE UR QL STRIP: NEGATIVE
NRBC BLD MANUAL-RTO: 0 /100 WBC
PH UR STRIP: 6 [PH] (ref 5–7)
PLATELET # BLD AUTO: 285 K/MCL (ref 140–450)
POTASSIUM SERPL-SCNC: 4 MMOL/L (ref 3.4–5.1)
PROT SERPL-MCNC: 6.9 G/DL (ref 6.4–8.2)
PROT UR STRIP-MCNC: 30 MG/DL
RAINBOW EXTRA TUBES HOLD SPECIMEN: NORMAL
RBC # BLD: 4.23 MIL/MCL (ref 4–5.2)
RBC #/AREA URNS HPF: ABNORMAL /HPF
SODIUM SERPL-SCNC: 137 MMOL/L (ref 135–145)
SP GR UR STRIP: >1.03 (ref 1–1.03)
SQUAMOUS #/AREA URNS HPF: ABNORMAL /HPF
TROPONIN I SERPL DL<=0.01 NG/ML-MCNC: <4 NG/L
UROBILINOGEN UR STRIP-MCNC: 0.2 MG/DL
WBC # BLD: 7.8 K/MCL (ref 4.2–11)
WBC #/AREA URNS HPF: ABNORMAL /HPF

## 2023-01-07 PROCEDURE — 99285 EMERGENCY DEPT VISIT HI MDM: CPT

## 2023-01-07 PROCEDURE — 85379 FIBRIN DEGRADATION QUANT: CPT | Performed by: EMERGENCY MEDICINE

## 2023-01-07 PROCEDURE — 85025 COMPLETE CBC W/AUTO DIFF WBC: CPT | Performed by: PHYSICIAN ASSISTANT

## 2023-01-07 PROCEDURE — 96360 HYDRATION IV INFUSION INIT: CPT

## 2023-01-07 PROCEDURE — 83690 ASSAY OF LIPASE: CPT | Performed by: PHYSICIAN ASSISTANT

## 2023-01-07 PROCEDURE — 80053 COMPREHEN METABOLIC PANEL: CPT | Performed by: PHYSICIAN ASSISTANT

## 2023-01-07 PROCEDURE — 71046 X-RAY EXAM CHEST 2 VIEWS: CPT

## 2023-01-07 PROCEDURE — 84703 CHORIONIC GONADOTROPIN ASSAY: CPT

## 2023-01-07 PROCEDURE — 83735 ASSAY OF MAGNESIUM: CPT | Performed by: PHYSICIAN ASSISTANT

## 2023-01-07 PROCEDURE — 10002807 HB RX 258: Performed by: EMERGENCY MEDICINE

## 2023-01-07 PROCEDURE — 74176 CT ABD & PELVIS W/O CONTRAST: CPT

## 2023-01-07 PROCEDURE — 93005 ELECTROCARDIOGRAM TRACING: CPT | Performed by: EMERGENCY MEDICINE

## 2023-01-07 PROCEDURE — 81001 URINALYSIS AUTO W/SCOPE: CPT | Performed by: EMERGENCY MEDICINE

## 2023-01-07 PROCEDURE — 84484 ASSAY OF TROPONIN QUANT: CPT | Performed by: PHYSICIAN ASSISTANT

## 2023-01-07 RX ADMIN — SODIUM CHLORIDE 1000 ML: 9 INJECTION, SOLUTION INTRAVENOUS at 15:40

## 2023-01-07 ASSESSMENT — HEART SCORE
AGE: LESS THAN OR EQUAL TO 45
HISTORY: SLIGHTLY SUSPICIOUS
TROPONIN: EQUAL OR LESS THAN NORMAL LIMIT
RISK FACTORS: NO RISK FACTORS KNOWN
EKG: NORMAL
HEART SCORE: 0

## 2023-01-08 LAB
ATRIAL RATE (BPM): 89
P AXIS (DEGREES): 59
PR-INTERVAL (MSEC): 136
QRS-INTERVAL (MSEC): 82
QT-INTERVAL (MSEC): 360
QTC: 438
R AXIS (DEGREES): 66
REPORT TEXT: NORMAL
T AXIS (DEGREES): 50
VENTRICULAR RATE EKG/MIN (BPM): 89

## 2023-01-09 ENCOUNTER — TELEPHONE (OUTPATIENT)
Dept: NEUROLOGY | Facility: CLINIC | Age: 43
End: 2023-01-09

## 2023-01-09 DIAGNOSIS — G35 MULTIPLE SCLEROSIS (HCC): Primary | ICD-10-CM

## 2023-01-09 NOTE — TELEPHONE ENCOUNTER
Pt reports she is continuing to have \"burining/squeezing\" in her chest and stomach. Patient went to the ER this weekend and the work up was clean. Two days prior patient went to PCP and was put on an antiobiotic for UTI. Patient has R sided numbness and weakness - baseline symptom. Patient denies any blurry vision or cognitive issues or any new weakness or tingling. Patient is concerned this is MS relapse. Please advise.

## 2023-01-09 NOTE — TELEPHONE ENCOUNTER
Pt stated she continues to have symptoms and needs to discuss details with neurologist. See previous Mychart message. Call pt to discuss and advise.

## 2023-01-10 RX ORDER — PREDNISONE 20 MG/1
TABLET ORAL
Qty: 30 TABLET | Refills: 0 | Status: SHIPPED | OUTPATIENT
Start: 2023-01-14

## 2023-01-10 NOTE — TELEPHONE ENCOUNTER
New symptom of burning  No systemic signs  Last steroid while back  She is getting better   So will observe for now

## 2023-01-10 NOTE — TELEPHONE ENCOUNTER
Pt spoke with Dr. Debra Zhu yesterday   She callback this morning she stated her symptom came back after talking to him   Please call to advice

## 2023-01-10 NOTE — TELEPHONE ENCOUNTER
RN called patient and informed her that Dr. Kenton Sever would like her to receive steroids. Patient is requesting to receive them close to her house. RN advised there is an infusion center in [place]. Per patient that would be her preference. RN advised she would send the referral to [facility] and she would be hearing from them.

## 2023-01-10 NOTE — TELEPHONE ENCOUNTER
Patient called back and the burning sensation has returned in the stomach, pressure on the left side of the chest. Resulting in loose stools today. Patient reports it started about 22:30 last night and continuing to this morning. Please advise.

## 2023-01-10 NOTE — TELEPHONE ENCOUNTER
RN faxed over referral to [facility] for Solumedrol Infusion 500mg x 3 days at [phone]. Fax confirmation received. Signed order sent to scan.

## 2023-01-12 NOTE — TELEPHONE ENCOUNTER
Patient called back and informed office she is still waiting to hear back about the PA for her infusion. RN spoke to the patient and she is going to wait for the PA from [facility]. RN advised if she gets worse to head to [facility] or [facility]. Patient verbalized understanding and did not have any further questions.

## 2023-01-17 ENCOUNTER — TELEPHONE (OUTPATIENT)
Dept: NEUROLOGY | Facility: CLINIC | Age: 43
End: 2023-01-17

## 2023-01-17 DIAGNOSIS — G35 MULTIPLE SCLEROSIS (HCC): Primary | ICD-10-CM

## 2023-01-17 NOTE — TELEPHONE ENCOUNTER
Patient called back and informed CRISTOPHER Menezes is not able to schedule patient 3 days in a row for her steroid infusion. RN deeply apologized this was an issue. RN informed the patient to call [facility] and cancel the appts and the PA. RN informed the patient she would start the PA process with our team and to call back with the time she would be able to come in tomorrow for her infusion. Patient verbalized understanding and did not have any further questions.

## 2023-01-18 ENCOUNTER — NURSE ONLY (OUTPATIENT)
Dept: NEUROLOGY | Facility: CLINIC | Age: 43
End: 2023-01-18
Payer: COMMERCIAL

## 2023-01-18 VITALS — DIASTOLIC BLOOD PRESSURE: 76 MMHG | HEART RATE: 70 BPM | RESPIRATION RATE: 16 BRPM | SYSTOLIC BLOOD PRESSURE: 126 MMHG

## 2023-01-18 DIAGNOSIS — G35 MULTIPLE SCLEROSIS (HCC): Primary | ICD-10-CM

## 2023-01-18 PROCEDURE — 3078F DIAST BP <80 MM HG: CPT | Performed by: OTHER

## 2023-01-18 PROCEDURE — 96365 THER/PROPH/DIAG IV INF INIT: CPT | Performed by: OTHER

## 2023-01-18 PROCEDURE — 3074F SYST BP LT 130 MM HG: CPT | Performed by: OTHER

## 2023-01-18 RX ORDER — METHYLPREDNISOLONE SODIUM SUCCINATE 500 MG/8ML
500 INJECTION INTRAMUSCULAR; INTRAVENOUS DAILY
Status: COMPLETED | OUTPATIENT
Start: 2023-01-18 | End: 2023-01-20

## 2023-01-18 RX ADMIN — METHYLPREDNISOLONE SODIUM SUCCINATE 500 MG: 500 INJECTION INTRAMUSCULAR; INTRAVENOUS at 09:42:00

## 2023-01-19 ENCOUNTER — NURSE ONLY (OUTPATIENT)
Dept: NEUROLOGY | Facility: CLINIC | Age: 43
End: 2023-01-19
Payer: COMMERCIAL

## 2023-01-19 VITALS — SYSTOLIC BLOOD PRESSURE: 143 MMHG | HEART RATE: 74 BPM | DIASTOLIC BLOOD PRESSURE: 82 MMHG

## 2023-01-19 DIAGNOSIS — G35 MULTIPLE SCLEROSIS (HCC): ICD-10-CM

## 2023-01-19 PROCEDURE — 96365 THER/PROPH/DIAG IV INF INIT: CPT | Performed by: OTHER

## 2023-01-19 PROCEDURE — 3079F DIAST BP 80-89 MM HG: CPT | Performed by: OTHER

## 2023-01-19 PROCEDURE — 3077F SYST BP >= 140 MM HG: CPT | Performed by: OTHER

## 2023-01-19 RX ADMIN — METHYLPREDNISOLONE SODIUM SUCCINATE 500 MG: 500 INJECTION INTRAMUSCULAR; INTRAVENOUS at 10:10:00

## 2023-01-19 NOTE — PROGRESS NOTES
IV started per Jus Black RN into right [facility] and Solu-Medrol infused over 20 minutes without difficulty. IV discontinued per RN. Patient tolerated infusion well with no complications.

## 2023-01-20 ENCOUNTER — TELEPHONE (OUTPATIENT)
Dept: NEUROLOGY | Facility: CLINIC | Age: 43
End: 2023-01-20

## 2023-01-20 ENCOUNTER — NURSE ONLY (OUTPATIENT)
Dept: NEUROLOGY | Facility: CLINIC | Age: 43
End: 2023-01-20
Payer: COMMERCIAL

## 2023-01-20 VITALS
WEIGHT: 210 LBS | DIASTOLIC BLOOD PRESSURE: 84 MMHG | BODY MASS INDEX: 34 KG/M2 | SYSTOLIC BLOOD PRESSURE: 142 MMHG | HEART RATE: 88 BPM

## 2023-01-20 DIAGNOSIS — G35 MULTIPLE SCLEROSIS (HCC): ICD-10-CM

## 2023-01-20 RX ORDER — ZOLPIDEM TARTRATE 10 MG/1
5 TABLET ORAL NIGHTLY PRN
Qty: 14 TABLET | Refills: 0 | Status: CANCELLED | OUTPATIENT
Start: 2023-01-20

## 2023-01-20 RX ORDER — ZOLPIDEM TARTRATE 5 MG/1
5 TABLET ORAL NIGHTLY PRN
Qty: 14 TABLET | Refills: 0 | Status: SHIPPED | OUTPATIENT
Start: 2023-01-20

## 2023-01-20 RX ADMIN — METHYLPREDNISOLONE SODIUM SUCCINATE 500 MG: 500 INJECTION INTRAMUSCULAR; INTRAVENOUS at 09:36:00

## 2023-01-20 NOTE — PROGRESS NOTES
IV started per Thai Harmon RN and Solu-Medrol infused over 20 minutes without difficulty. IV discontinued per RN. Patient tolerated infusion well with no complications. Patient states she is feeling much better and left big toe (site of hardware rejection and future surgery) feels much better also.   Is having trouble sleeping, AMBIEN ordered in separate TE.

## 2023-01-20 NOTE — TELEPHONE ENCOUNTER
Patient unable to sleep due to SOLUMEDROL infusions. AMBIEN 5 mg PRN ordered, pended for provider authorization.

## 2023-01-25 ENCOUNTER — APPOINTMENT (OUTPATIENT)
Dept: GENERAL RADIOLOGY | Age: 43
End: 2023-01-25
Attending: EMERGENCY MEDICINE

## 2023-01-25 ENCOUNTER — HOSPITAL ENCOUNTER (EMERGENCY)
Age: 43
Discharge: HOME | End: 2023-01-25
Attending: EMERGENCY MEDICINE

## 2023-01-25 VITALS
HEART RATE: 79 BPM | HEIGHT: 65 IN | WEIGHT: 212 LBS | RESPIRATION RATE: 16 BRPM | TEMPERATURE: 97.5 F | OXYGEN SATURATION: 96 % | BODY MASS INDEX: 35.32 KG/M2 | SYSTOLIC BLOOD PRESSURE: 129 MMHG | DIASTOLIC BLOOD PRESSURE: 73 MMHG

## 2023-01-25 DIAGNOSIS — R00.2 PALPITATIONS: Primary | ICD-10-CM

## 2023-01-25 LAB
ALBUMIN SERPL-MCNC: 3.1 G/DL (ref 3.6–5.1)
ALBUMIN/GLOB SERPL: 0.9 {RATIO} (ref 1–2.4)
ALP SERPL-CCNC: 52 UNITS/L (ref 45–117)
ALT SERPL-CCNC: 129 UNITS/L
ANION GAP SERPL CALC-SCNC: 15 MMOL/L (ref 7–19)
APPEARANCE UR: ABNORMAL
AST SERPL-CCNC: 42 UNITS/L
BACTERIA #/AREA URNS HPF: ABNORMAL /HPF
BASOPHILS # BLD: 0 K/MCL (ref 0–0.3)
BASOPHILS NFR BLD: 0 %
BILIRUB SERPL-MCNC: 1.1 MG/DL (ref 0.2–1)
BILIRUB UR QL STRIP: NEGATIVE
BUN SERPL-MCNC: 14 MG/DL (ref 6–20)
BUN/CREAT SERPL: 22 (ref 7–25)
CALCIUM SERPL-MCNC: 8.2 MG/DL (ref 8.4–10.2)
CHLORIDE SERPL-SCNC: 105 MMOL/L (ref 97–110)
CO2 SERPL-SCNC: 21 MMOL/L (ref 21–32)
COLOR UR: YELLOW
CREAT SERPL-MCNC: 0.63 MG/DL (ref 0.51–0.95)
DEPRECATED RDW RBC: 43.2 FL (ref 39–50)
EOSINOPHIL # BLD: 0 K/MCL (ref 0–0.5)
EOSINOPHIL NFR BLD: 0 %
ERYTHROCYTE [DISTWIDTH] IN BLOOD: 13.2 % (ref 11–15)
FASTING DURATION TIME PATIENT: ABNORMAL H
GFR SERPLBLD BASED ON 1.73 SQ M-ARVRAT: >90 ML/MIN
GLOBULIN SER-MCNC: 3.6 G/DL (ref 2–4)
GLUCOSE SERPL-MCNC: 180 MG/DL (ref 70–99)
GLUCOSE UR STRIP-MCNC: 100 MG/DL
HCT VFR BLD CALC: 42.5 % (ref 36–46.5)
HGB BLD-MCNC: 13.9 G/DL (ref 12–15.5)
HGB UR QL STRIP: NEGATIVE
HYALINE CASTS #/AREA URNS LPF: ABNORMAL /LPF
IMM GRANULOCYTES # BLD AUTO: 0.1 K/MCL (ref 0–0.2)
IMM GRANULOCYTES # BLD: 1 %
KETONES UR STRIP-MCNC: ABNORMAL MG/DL
LEUKOCYTE ESTERASE UR QL STRIP: ABNORMAL
LYMPHOCYTES # BLD: 0.5 K/MCL (ref 1–4.8)
LYMPHOCYTES NFR BLD: 6 %
MCH RBC QN AUTO: 29.4 PG (ref 26–34)
MCHC RBC AUTO-ENTMCNC: 32.7 G/DL (ref 32–36.5)
MCV RBC AUTO: 90 FL (ref 78–100)
MONOCYTES # BLD: 0.5 K/MCL (ref 0.3–0.9)
MONOCYTES NFR BLD: 6 %
MUCOUS THREADS URNS QL MICRO: PRESENT
NEUTROPHILS # BLD: 7.6 K/MCL (ref 1.8–7.7)
NEUTROPHILS NFR BLD: 87 %
NITRITE UR QL STRIP: NEGATIVE
NRBC BLD MANUAL-RTO: 0 /100 WBC
PH UR STRIP: 5.5 [PH] (ref 5–7)
PLATELET # BLD AUTO: 319 K/MCL (ref 140–450)
POTASSIUM SERPL-SCNC: 3.6 MMOL/L (ref 3.4–5.1)
PROT SERPL-MCNC: 6.7 G/DL (ref 6.4–8.2)
PROT UR STRIP-MCNC: ABNORMAL MG/DL
RBC # BLD: 4.72 MIL/MCL (ref 4–5.2)
RBC #/AREA URNS HPF: ABNORMAL /HPF
SODIUM SERPL-SCNC: 137 MMOL/L (ref 135–145)
SP GR UR STRIP: 1.03 (ref 1–1.03)
SQUAMOUS #/AREA URNS HPF: ABNORMAL /HPF
T4 FREE SERPL-MCNC: 0.9 NG/DL (ref 0.8–1.5)
TROPONIN I SERPL DL<=0.01 NG/ML-MCNC: <4 NG/L
TSH SERPL-ACNC: 1.3 MCUNITS/ML (ref 0.35–5)
UROBILINOGEN UR STRIP-MCNC: 0.2 MG/DL
WBC # BLD: 8.7 K/MCL (ref 4.2–11)
WBC #/AREA URNS HPF: ABNORMAL /HPF

## 2023-01-25 PROCEDURE — 81001 URINALYSIS AUTO W/SCOPE: CPT | Performed by: EMERGENCY MEDICINE

## 2023-01-25 PROCEDURE — 85025 COMPLETE CBC W/AUTO DIFF WBC: CPT | Performed by: EMERGENCY MEDICINE

## 2023-01-25 PROCEDURE — 84439 ASSAY OF FREE THYROXINE: CPT | Performed by: EMERGENCY MEDICINE

## 2023-01-25 PROCEDURE — 84484 ASSAY OF TROPONIN QUANT: CPT | Performed by: EMERGENCY MEDICINE

## 2023-01-25 PROCEDURE — 84443 ASSAY THYROID STIM HORMONE: CPT | Performed by: EMERGENCY MEDICINE

## 2023-01-25 PROCEDURE — 96360 HYDRATION IV INFUSION INIT: CPT

## 2023-01-25 PROCEDURE — 71046 X-RAY EXAM CHEST 2 VIEWS: CPT

## 2023-01-25 PROCEDURE — 87086 URINE CULTURE/COLONY COUNT: CPT | Performed by: EMERGENCY MEDICINE

## 2023-01-25 PROCEDURE — 10002807 HB RX 258: Performed by: EMERGENCY MEDICINE

## 2023-01-25 PROCEDURE — 99285 EMERGENCY DEPT VISIT HI MDM: CPT

## 2023-01-25 PROCEDURE — 93005 ELECTROCARDIOGRAM TRACING: CPT | Performed by: EMERGENCY MEDICINE

## 2023-01-25 PROCEDURE — 80053 COMPREHEN METABOLIC PANEL: CPT | Performed by: EMERGENCY MEDICINE

## 2023-01-25 RX ADMIN — SODIUM CHLORIDE 1000 ML: 9 INJECTION, SOLUTION INTRAVENOUS at 20:25

## 2023-01-25 ASSESSMENT — ENCOUNTER SYMPTOMS
FEVER: 0
BACK PAIN: 0
ACTIVITY CHANGE: 0
NAUSEA: 0
CHILLS: 0
POLYPHAGIA: 0
SORE THROAT: 0
DIARRHEA: 0
VOMITING: 0
POLYDIPSIA: 0
DIZZINESS: 0
EYE PAIN: 0
WOUND: 0
ABDOMINAL PAIN: 0
BRUISES/BLEEDS EASILY: 0
COLOR CHANGE: 0
SHORTNESS OF BREATH: 0
CONFUSION: 0
EYE REDNESS: 0
EYE DISCHARGE: 0
HEADACHES: 0
COUGH: 0
WHEEZING: 0
NUMBNESS: 0
FACIAL SWELLING: 0

## 2023-01-25 ASSESSMENT — PAIN SCALES - GENERAL: PAINLEVEL_OUTOF10: 6

## 2023-01-26 LAB
ATRIAL RATE (BPM): 103
P AXIS (DEGREES): 73
PR-INTERVAL (MSEC): 126
QRS-INTERVAL (MSEC): 90
QT-INTERVAL (MSEC): 332
QTC: 434
R AXIS (DEGREES): 77
RAINBOW EXTRA TUBES HOLD SPECIMEN: NORMAL
REPORT TEXT: NORMAL
T AXIS (DEGREES): 74
VENTRICULAR RATE EKG/MIN (BPM): 103

## 2023-01-27 LAB — BACTERIA UR CULT: NORMAL

## 2023-02-14 ENCOUNTER — TELEPHONE (OUTPATIENT)
Dept: NEUROLOGY | Facility: CLINIC | Age: 43
End: 2023-02-14

## 2023-02-14 DIAGNOSIS — G35 MULTIPLE SCLEROSIS (HCC): ICD-10-CM

## 2023-02-14 RX ORDER — RENAGEL 800 MG/1
14 TABLET ORAL DAILY
Qty: 30 TABLET | Refills: 7 | Status: SHIPPED | OUTPATIENT
Start: 2023-02-14

## 2023-02-14 NOTE — TELEPHONE ENCOUNTER
Fax received from [address] [place] [address]. Patient must use [facility] specialty pharmacy for refills.     Prescription to be forwarded to [facility].

## 2023-02-28 ENCOUNTER — LAB REQUISITION (OUTPATIENT)
Dept: LAB | Facility: HOSPITAL | Age: 43
End: 2023-02-28
Payer: COMMERCIAL

## 2023-02-28 DIAGNOSIS — S92.322K: ICD-10-CM

## 2023-02-28 DIAGNOSIS — S92.312K: ICD-10-CM

## 2023-02-28 DIAGNOSIS — M20.12 HALLUX VALGUS (ACQUIRED), LEFT FOOT: ICD-10-CM

## 2023-02-28 PROCEDURE — 87075 CULTR BACTERIA EXCEPT BLOOD: CPT | Performed by: ORTHOPAEDIC SURGERY

## 2023-02-28 PROCEDURE — 87205 SMEAR GRAM STAIN: CPT | Performed by: ORTHOPAEDIC SURGERY

## 2023-02-28 PROCEDURE — 87070 CULTURE OTHR SPECIMN AEROBIC: CPT | Performed by: ORTHOPAEDIC SURGERY

## 2023-05-24 ASSESSMENT — HEART SCORE
HISTORY: SLIGHTLY SUSPICIOUS
EKG: NORMAL
TROPONIN: EQUAL OR LESS THAN NORMAL LIMIT
AGE: LESS THAN OR EQUAL TO 45
HEART SCORE: 1
RISK FACTORS: 1-2 RISK FACTORS

## 2023-06-15 DIAGNOSIS — G35 MULTIPLE SCLEROSIS (HCC): ICD-10-CM

## 2023-06-15 RX ORDER — TERIFLUNOMIDE 14 MG/1
14 TABLET, FILM COATED ORAL DAILY
Qty: 30 TABLET | Refills: 5 | Status: SHIPPED | OUTPATIENT
Start: 2023-06-15

## 2023-06-15 NOTE — TELEPHONE ENCOUNTER
Received a call from [facility] specialty pharmacy requesting a new prescription for generic Aubagio. Medication will no longer be on patient formulary and are requesting generic prescription or alternate therapy. Will send generic prescription as requested.     Medication pended for provider approval.

## 2023-08-31 ENCOUNTER — TELEPHONE (OUTPATIENT)
Dept: NEUROLOGY | Facility: CLINIC | Age: 43
End: 2023-08-31

## 2023-08-31 NOTE — TELEPHONE ENCOUNTER
Prior authorization initiated through Cover my meds for patient use of generic Aubagio. Awaiting clinical questions.

## 2023-09-06 NOTE — TELEPHONE ENCOUNTER
Fax received from [employer]. Patient has been approved for generic Aubagio, Teriflunomide 14 mg tablets. Approval granted: 9/5/2023 - 9/5/2024. Approval letter placed in [place] nursing binder.     Patient notified of approval.

## 2023-12-12 DIAGNOSIS — G35 MULTIPLE SCLEROSIS (HCC): ICD-10-CM

## 2023-12-12 RX ORDER — TERIFLUNOMIDE 14 MG/1
14 TABLET, FILM COATED ORAL DAILY
Qty: 30 TABLET | Refills: 2 | Status: SHIPPED | OUTPATIENT
Start: 2023-12-12

## 2023-12-12 NOTE — TELEPHONE ENCOUNTER
Medication: Teriflunomide     Date of last refill: 6/15/2023 (#30/5)  [place]     Last office visit: 10/19/2022  Due back to clinic per last office note:  RTC in 6 months  Date next office visit scheduled:  Patient sent message to schedule follow up appointment  No future appointments.         Last OV note recommendation:    MPRESSION  Multiple sclerosis ([facility] [address]  (primary encounter diagnosis)     Stable clinically  No changes needed  We just have to have the last set of MRI images loaded for review  Reviewed AUBAGIO      RTC 6 months        Castro Arshad MD

## 2023-12-15 RX ORDER — LEVONORGESTREL AND ETHINYL ESTRADIOL 0.15-0.03
1 KIT ORAL DAILY
Qty: 91 TABLET | Refills: 4 | OUTPATIENT
Start: 2023-12-15

## 2024-02-26 DIAGNOSIS — R20.9 DISTURBANCE OF SKIN SENSATION: ICD-10-CM

## 2024-02-26 DIAGNOSIS — G62.9 SENSORY NEUROPATHY: ICD-10-CM

## 2024-02-26 DIAGNOSIS — M25.559 PAIN IN JOINT INVOLVING PELVIC REGION AND THIGH, UNSPECIFIED LATERALITY: ICD-10-CM

## 2024-02-26 DIAGNOSIS — R20.0 NUMBNESS AND TINGLING OF FOOT: ICD-10-CM

## 2024-02-26 DIAGNOSIS — R20.2 NUMBNESS AND TINGLING OF FOOT: ICD-10-CM

## 2024-02-26 RX ORDER — GABAPENTIN 300 MG/1
600 CAPSULE ORAL 2 TIMES DAILY
Qty: 120 CAPSULE | Refills: 0 | Status: SHIPPED | OUTPATIENT
Start: 2024-02-26

## 2024-02-26 NOTE — TELEPHONE ENCOUNTER
Medication: Gabapentin 300 mg     Date of last refill: 11/14/2022 with 3 addt refills  [place]     Last office visit: 10/19/2022  Due back to clinic per last office note:  RTC in 6 months  Date next office visit schedule:03/14/2024        Last OV note recommendation:     MPRESSION  Multiple sclerosis (HCC)  (primary encounter diagnosis)     Stable clinically  No changes needed  We just have to have the last set of MRI images loaded for review  Reviewed AUBAGIO      RTC 6 months        Bubba Smith MD

## 2024-03-03 ENCOUNTER — E-ADVICE (OUTPATIENT)
Dept: OTHER | Age: 44
End: 2024-03-03

## 2024-03-12 ENCOUNTER — OFFICE VISIT (OUTPATIENT)
Dept: NEUROLOGY | Facility: CLINIC | Age: 44
End: 2024-03-12
Payer: COMMERCIAL

## 2024-03-12 VITALS
HEART RATE: 86 BPM | RESPIRATION RATE: 16 BRPM | DIASTOLIC BLOOD PRESSURE: 84 MMHG | BODY MASS INDEX: 33.75 KG/M2 | SYSTOLIC BLOOD PRESSURE: 120 MMHG | WEIGHT: 210 LBS | HEIGHT: 66 IN

## 2024-03-12 DIAGNOSIS — G35 MULTIPLE SCLEROSIS (HCC): Primary | ICD-10-CM

## 2024-03-12 DIAGNOSIS — M54.14 THORACIC RADICULOPATHY: ICD-10-CM

## 2024-03-12 PROCEDURE — 3074F SYST BP LT 130 MM HG: CPT | Performed by: OTHER

## 2024-03-12 PROCEDURE — 3079F DIAST BP 80-89 MM HG: CPT | Performed by: OTHER

## 2024-03-12 PROCEDURE — 3008F BODY MASS INDEX DOCD: CPT | Performed by: OTHER

## 2024-03-12 PROCEDURE — 99214 OFFICE O/P EST MOD 30 MIN: CPT | Performed by: OTHER

## 2024-03-12 RX ORDER — TERIFLUNOMIDE 14 MG/1
1 TABLET, FILM COATED ORAL DAILY
COMMUNITY
End: 2024-03-12

## 2024-03-12 RX ORDER — TERIFLUNOMIDE 14 MG/1
1 TABLET, FILM COATED ORAL DAILY
Qty: 30 TABLET | Refills: 5 | Status: SHIPPED | OUTPATIENT
Start: 2024-03-12

## 2024-03-12 RX ORDER — OMEPRAZOLE 20 MG/1
20 CAPSULE, DELAYED RELEASE ORAL DAILY
COMMUNITY
Start: 2024-02-25

## 2024-03-12 NOTE — PROGRESS NOTES
NEUROLOGY  [facility]    Ruthann Cage  9/19/1980  Primary Care Provider:  Chery Sánchez MD    3/12/2024  43 year old yo,  was last seen on:: October 2022    Seen for/plans last visit:  Transfer of care      Previous visit and existing record notes reviewed in preparation for the face to face visit.  Relevant labs and studies reviewed and will be noted in relevant areas of this record.  Accompanied visit:     (x) No.      Present condition:  She is experiencing chest pains and cleared by Cardiologist  GI and also cleared.  The pain feels like there is a particular area on the left parasternal and feels like there is air. The pain lasts throughout the day but not constant all day long    Balance is OK  No bladder control issues    After her initial visit with me in October 2022 she came back for Solu-Medrol infusion in January 2023 for development of burning sensations and she responded well.    Past History update/new problem(s): none    Review of Systems:  Review of Systems:  Denies systemic symptoms     No CP or SOB.  No GI or  symptoms. Relevant Neuro as noted above.      Medications:      Current Outpatient Medications:     omeprazole 20 MG Oral Capsule Delayed Release, Take 1 capsule (20 mg total) by mouth daily., Disp: , Rfl:     Teriflunomide 14 MG Oral Tab, Take 1 tablet by mouth daily., Disp: , Rfl:     gabapentin 300 MG Oral Cap, Take 2 capsules (600 mg total) by mouth 2 (two) times daily. (Patient taking differently: Take 3 capsules (900 mg total) by mouth every morning.), Disp: 120 capsule, Rfl: 0    Probiotic Product (PROBIOTIC OR), Take 1 capsule by mouth daily., Disp: , Rfl:     Calcium Carbonate (CALCIUM 600 OR), Take 1 tablet by mouth daily., Disp: , Rfl:     Cholecalciferol (VITAMIN D) 50 MCG (2000 UT) Oral Cap, Take 1 capsule (2,000 Units total) by mouth daily., Disp: , Rfl:     Biotin 94069 MCG Oral Tab, Take 1 tablet by mouth daily.,  Disp: , Rfl:     Calcium Carbonate-Vitamin D (CALCIUM-D) 600-400 MG-UNIT Oral Tab, Take 1 tablet by mouth daily., Disp: , Rfl:     JOLESSA 0.15-0.03 MG Oral Tab, 1 tablet daily., Disp: , Rfl:   PRN:     Allergies:  No Known Allergies       EXAM:  /84 (BP Location: Left arm, Patient Position: Sitting, Cuff Size: adult)   Pulse 86   Resp 16   Ht 66\"   Wt 210 lb (95.3 kg)   LMP 12/23/2023   BMI 33.89 kg/m²   Looks stated age  General Exam:  HENT:  pink conjunctiva anicteric sclerae  Neck no adenopathy, thyroid normal  Heart and Lungs:  normal  Extremities: no cyanosis, skin changes    NEURO  NEURO  Able to relate events with fluent speech and intact comprehension  CN 2-12: pupils reactive, VF full face symmetric sensation and movement tongue midline  No motor focal findings  Sensory: no lateralizing findings  Reflexes are symmetric  UMN signs: none  Gait: narrow based          INTERPRETATION of RELEVANT LABS and other DATA:    Review of previous MRI showed the presence of lesion in the brain and also at C2 cervical cord level.  However in 2020, MRI of the thoracic spine did not show any lesion.      Problem/s Identified this visit:   1. Multiple sclerosis (HCC)    2. Thoracic radiculopathy          Discussion plus Diagnostics & Treatment Orders:  Clinically she has been doing well except for a relapse sometime in January 23 that responded well to steroid.  Now she is having this chest pain which could be a manifestation of thoracic radiculopathy from a thoracic cord lesion.    Clinically she is I said stable but will want a make sure that MRI is also stable and is not progressing silently.  Follow-up studies are warranted    MRI of BRAIN and Cord ordered  Stay on Teriflunomide      (x) Discussed potential side effects of any treatment relevant to above.  Includes explanation of tests as necessary.    Return in about 6 months (around 9/12/2024).      Patient understands that if needed, based on condition and  or test results, follow up will be readjusted      Bubba Smith MD  Vascular & General Neurology  Director, Multiple Sclerosis Program  [facility]  3/12/2024, Time completed 9:00 AM    Decision making:  ( x ) labs reviewed/ordered - 1  (  ) new diagnosis: - 1  ( x) Images & studies independently reviewed -non F2F  (  ) Case/studies discussed with other caregivers - -non F2F  (  ) Telephone time with patiern or authorized Fam member--non F2F  ( x ) other records reviewed --non F2F including consultations  (  ) [facility] meetings - patient not present --non F2F  (  ) Independent Historian obtained    Non Face to Face CPT code 47528/94467 applies as documented above    PROCEDURE DONE     (   ) see notes        After visit, patient was escorted out and handed-off discharge process and instructions to the check out desk.  No additional issues relevant to visit were raised to staff at this time interval.        This document is to be interpreted as my current opinion regarding the case as of the stated date of service based on the information available to me at this time and may supersedes any prior opinion expressed either orally or in writing.  Services rendered are only within the scope of direct medical care  Sometimes, reports may have been prepared partially using a speech recognition software technology.  If a word or phrase is confusing or out of context, please do not hesitate to call for clarification.

## 2024-03-12 NOTE — PATIENT INSTRUCTIONS
Refill policies:    Allow 2-3 business days for refills; controlled substances may take longer.  Contact your pharmacy at least 5 days prior to running out of medication and have them send an electronic request or submit request through the “request refill” option in your [employer] account.  Refills are not addressed on weekends; covering physicians do not authorize routine medications on weekends.  No narcotics or controlled substances are refilled after noon on Fridays or by on call physicians.  By law, narcotics must be electronically prescribed.  A 30 day supply with no refills is the maximum allowed.  If your prescription is due for a refill, you may be due for a follow up appointment.  To best provide you care, patients receiving routine medications need to be seen at least once a year.  Patients receiving narcotic/controlled substance medications need to be seen at least once every 3 months.  In the event that your preferred pharmacy does not have the requested medication in stock (e.g. Backordered), it is your responsibility to find another pharmacy that has the requested medication available.  We will gladly send a new prescription to that pharmacy at your request.    Scheduling Tests:    If your physician has ordered radiology tests such as MRI or CT scans, please contact Central Scheduling at [phone] right away to schedule the test.  Once scheduled, the [facility] Centralized Referral Team will work with your insurance carrier to obtain pre-certification or prior authorization.  Depending on your insurance carrier, approval may take 3-10 days.  It is highly recommended patients assure they have received an authorization before having a test performed.  If test is done without insurance authorization, patient may be responsible for the entire amount billed.      Precertification and Prior Authorizations:  If your physician has recommended that you have a procedure or additional testing performed the [facility]  Centralized Referral Team will contact your insurance carrier to obtain pre-certification or prior authorization.    You are strongly encouraged to contact your insurance carrier to verify that your procedure/test has been approved and is a COVERED benefit.  Although the [facility] Centralized Referral Team does its due diligence, the insurance carrier gives the disclaimer that \"Although the procedure is authorized, this does not guarantee payment.\"    Ultimately the patient is responsible for payment.   Thank you for your understanding in this matter.  Paperwork Completion:  If you require FMLA or disability paperwork for your recovery, please make sure to either drop it off or have it faxed to our office at [phone]. Be sure the form has your name and date of birth on it.  The form will be faxed to our Forms Department and they will complete it for you.  There is a 25$ fee for all forms that need to be filled out.  Please be aware there is a 10-14 day turnaround time.  You will need to sign a release of information (JULIANNA) form if your paperwork does not come with one.  You may call the Forms Department with any questions at [phone].  Their fax number is [phone].

## 2024-03-26 RX ORDER — LEVONORGESTREL AND ETHINYL ESTRADIOL 0.15-0.03
1 KIT ORAL DAILY
Qty: 91 TABLET | Refills: 0 | Status: SHIPPED | OUTPATIENT
Start: 2024-03-26

## 2024-03-29 DIAGNOSIS — R20.2 NUMBNESS AND TINGLING OF FOOT: ICD-10-CM

## 2024-03-29 DIAGNOSIS — R20.9 DISTURBANCE OF SKIN SENSATION: ICD-10-CM

## 2024-03-29 DIAGNOSIS — G62.9 SENSORY NEUROPATHY: ICD-10-CM

## 2024-03-29 DIAGNOSIS — R20.0 NUMBNESS AND TINGLING OF FOOT: ICD-10-CM

## 2024-03-29 DIAGNOSIS — M25.559 PAIN IN JOINT INVOLVING PELVIC REGION AND THIGH, UNSPECIFIED LATERALITY: ICD-10-CM

## 2024-03-29 NOTE — TELEPHONE ENCOUNTER
Medication: Gabapentin 600 mg     Date of last refill: 02/26/2024  [place]      Last office visit: 03/14/2024  Due back to clinic per last office note:    Date next office visit scheduled:    Future Appointments   Date Time Provider Department Center   9/16/2024  9:00 AM Bubba Smith MD [facility]           Last OV note recommendation:    Discussion plus Diagnostics & Treatment Orders:  Clinically she has been doing well except for a relapse sometime in January 23 that responded well to steroid.  Now she is having this chest pain which could be a manifestation of thoracic radiculopathy from a thoracic cord lesion.     Clinically she is I said stable but will want a make sure that MRI is also stable and is not progressing silently.  Follow-up studies are warranted     MRI of BRAIN and Cord ordered  Stay on Teriflunomide        (x) Discussed potential side effects of any treatment relevant to above.  Includes explanation of tests as necessary.     Return in about 6 months (around 9/12/2024).

## 2024-03-30 RX ORDER — GABAPENTIN 300 MG/1
900 CAPSULE ORAL EVERY MORNING
Qty: 270 CAPSULE | Refills: 1 | Status: SHIPPED | OUTPATIENT
Start: 2024-03-30

## 2024-03-31 ENCOUNTER — E-ADVICE (OUTPATIENT)
Dept: OTHER | Age: 44
End: 2024-03-31

## 2024-04-02 ENCOUNTER — APPOINTMENT (OUTPATIENT)
Dept: OBGYN | Age: 44
End: 2024-04-02

## 2024-04-03 RX ORDER — LEVONORGESTREL AND ETHINYL ESTRADIOL 0.15-0.03
1 KIT ORAL DAILY
Qty: 91 TABLET | Refills: 0 | Status: SHIPPED | OUTPATIENT
Start: 2024-04-03

## 2024-04-19 ENCOUNTER — TELEPHONE (OUTPATIENT)
Dept: NEUROLOGY | Facility: CLINIC | Age: 44
End: 2024-04-19

## 2024-04-19 NOTE — TELEPHONE ENCOUNTER
Explained that Dr. FINLEY is out of the office   She understood and would like her results next week when he gets back   Thanks

## 2024-04-30 ENCOUNTER — APPOINTMENT (OUTPATIENT)
Dept: OBGYN | Age: 44
End: 2024-04-30

## 2024-04-30 VITALS
HEART RATE: 92 BPM | HEIGHT: 66 IN | SYSTOLIC BLOOD PRESSURE: 113 MMHG | WEIGHT: 223.5 LBS | DIASTOLIC BLOOD PRESSURE: 68 MMHG | BODY MASS INDEX: 35.92 KG/M2

## 2024-04-30 DIAGNOSIS — Z01.419 ENCOUNTER FOR ROUTINE GYNECOLOGICAL EXAMINATION WITH PAPANICOLAOU SMEAR OF CERVIX: Primary | ICD-10-CM

## 2024-04-30 DIAGNOSIS — Z11.51 SCREENING FOR HPV (HUMAN PAPILLOMAVIRUS): ICD-10-CM

## 2024-04-30 RX ORDER — LEVONORGESTREL AND ETHINYL ESTRADIOL 0.15-0.03
1 KIT ORAL DAILY
Qty: 91 TABLET | Refills: 4 | Status: SHIPPED | OUTPATIENT
Start: 2024-04-30

## 2024-04-30 ASSESSMENT — PATIENT HEALTH QUESTIONNAIRE - PHQ9
SUM OF ALL RESPONSES TO PHQ9 QUESTIONS 1 AND 2: 0
1. LITTLE INTEREST OR PLEASURE IN DOING THINGS: NOT AT ALL
SUM OF ALL RESPONSES TO PHQ9 QUESTIONS 1 AND 2: 0
CLINICAL INTERPRETATION OF PHQ2 SCORE: NO FURTHER SCREENING NEEDED
2. FEELING DOWN, DEPRESSED OR HOPELESS: NOT AT ALL

## 2024-05-09 LAB
CASE RPRT: NORMAL
CYTOLOGY CVX/VAG STUDY: NORMAL
HPV16+18+45 E6+E7MRNA CVX NAA+PROBE: NEGATIVE
Lab: NORMAL
PAP EDUCATIONAL NOTE: NORMAL
SPECIMEN ADEQUACY: NORMAL

## 2024-05-21 ENCOUNTER — TELEPHONE (OUTPATIENT)
Dept: NEUROLOGY | Facility: CLINIC | Age: 44
End: 2024-05-21

## 2024-05-21 DIAGNOSIS — G35 MULTIPLE SCLEROSIS (HCC): Primary | ICD-10-CM

## 2024-05-21 RX ORDER — TERIFLUNOMIDE 14 MG/1
1 TABLET, FILM COATED ORAL DAILY
Qty: 30 TABLET | Refills: 5 | Status: SHIPPED | OUTPATIENT
Start: 2024-05-21 | End: 2024-05-23

## 2024-05-21 NOTE — TELEPHONE ENCOUNTER
Patient concerned because she took her last 14mg Teriflunomide tab yesterday, and the drug is out of stock in most locations (both 7mg and 14mg), and the cost is prohibitive due to no copay assistance for those pharmacies that do have the medication.  Patient is wondering about next steps.    Advised patient that teriflunomide stays in the system for anywhere from 8 months to 2 years, so there isn't an immediate concern.  Patient states she is not currently having any difficulties, and would like to stay on the teriflunomide if possible, to avoid going through switching meds and experiencing side effects.  She will defer to Dr. Smith's professional opinion, however, on best course of action for her given the current uncertainty of availability of generic teriflunomide.

## 2024-05-21 NOTE — TELEPHONE ENCOUNTER
Pls advise that if the anticipated duration of supply shortage will be greater than 3 months, then we should start discussion about switching her to another DMT  If JCV negative, the TYSABRI is a safe option  Otherwise, switch to the B cell depletors  Or can consider MAVENCLAD (with a promise of an endpoint of treatment)

## 2024-05-21 NOTE — TELEPHONE ENCOUNTER
Patient called. [facility] does not dispense this medication Teriflunomide. She said it has to be filled through a specialty pharmacy which is [facility] she states. Patient would like to discuss options if there are payment assistance programs for generic versus brand, or possibly a different medication due to the cost being too high. Please call patient back to discuss. Thank you.

## 2024-05-21 NOTE — TELEPHONE ENCOUNTER
Patient stated needs teriflunomide to be changed to a new pharmacy.    Patient stated she has no rx left and hoping to get it refilled asap.    Send to:  [facility]

## 2024-05-23 RX ORDER — TERIFLUNOMIDE 7 MG/1
7 TABLET, FILM COATED ORAL DAILY
Qty: 90 TABLET | Refills: 1 | Status: SHIPPED | OUTPATIENT
Start: 2024-05-23

## 2024-05-23 NOTE — TELEPHONE ENCOUNTER
Per review, its appears that [facility] has the 7 mg tablets of Teriflunomide available.    Called and informed patient we will send prescription there for dispensing.    Agreeable to plan.

## 2024-09-11 ENCOUNTER — TELEPHONE (OUTPATIENT)
Dept: NEUROLOGY | Facility: CLINIC | Age: 44
End: 2024-09-11

## 2024-09-11 ENCOUNTER — OFFICE VISIT (OUTPATIENT)
Dept: NEUROLOGY | Facility: CLINIC | Age: 44
End: 2024-09-11
Payer: COMMERCIAL

## 2024-09-11 VITALS
DIASTOLIC BLOOD PRESSURE: 82 MMHG | HEART RATE: 81 BPM | OXYGEN SATURATION: 97 % | HEIGHT: 66 IN | RESPIRATION RATE: 14 BRPM | SYSTOLIC BLOOD PRESSURE: 116 MMHG | BODY MASS INDEX: 34 KG/M2

## 2024-09-11 DIAGNOSIS — M54.14 THORACIC RADICULOPATHY: ICD-10-CM

## 2024-09-11 DIAGNOSIS — G35 MS (MULTIPLE SCLEROSIS) (HCC): Primary | ICD-10-CM

## 2024-09-11 DIAGNOSIS — G62.9 SENSORY NEUROPATHY: ICD-10-CM

## 2024-09-11 DIAGNOSIS — G35 MULTIPLE SCLEROSIS (HCC): Primary | ICD-10-CM

## 2024-09-11 PROCEDURE — 3074F SYST BP LT 130 MM HG: CPT | Performed by: OTHER

## 2024-09-11 PROCEDURE — 3079F DIAST BP 80-89 MM HG: CPT | Performed by: OTHER

## 2024-09-11 PROCEDURE — 99214 OFFICE O/P EST MOD 30 MIN: CPT | Performed by: OTHER

## 2024-09-11 RX ORDER — AMLODIPINE BESYLATE 2.5 MG/1
2.5 TABLET ORAL DAILY
COMMUNITY
Start: 2024-08-14

## 2024-09-11 RX ORDER — TERIFLUNOMIDE 7 MG/1
7 TABLET, FILM COATED ORAL DAILY
Qty: 90 TABLET | Refills: 1 | Status: SHIPPED | OUTPATIENT
Start: 2024-09-11

## 2024-09-11 NOTE — PROGRESS NOTES
NEUROLOGY  [facility]       Ruthann Cage  9/19/1980  Primary Care Provider:  Chery Sánchez MD    9/11/2024  43 year old yo,  was last seen on:: March    Seen for/plans last visit:  MS and was complaining of CHEST pains    Previous visit and existing record notes reviewed in preparation for the face to face visit.  Relevant labs and studies reviewed and will be noted in relevant areas of this record.  Accompanied visit:     (x) No.      Present condition:  Chest pains pressure still  Not cardiac  CT chest negative  No bladder incontinence but just urgency  Balance is off, \"loses\" balance more often    MRI done to look into the lesion burden:  supratentorial Minimal brain burden, and lesion at C2  none in thoracic      Past History update/new problem(s): as above    Review of Systems:  Review of Systems:  Denies systemic symptoms except fatigue.  She takes Gabapentin 300 mg at 10 AM and has since switched it to HS     No CP or SOB.  No GI or  symptoms. Relevant Neuro as noted above.      Medications:      Current Outpatient Medications:     amLODIPine 2.5 MG Oral Tab, Take 1 tablet (2.5 mg total) by mouth daily., Disp: , Rfl:     Teriflunomide (AUBAGIO) 7 MG Oral Tab, Take 7 mg by mouth daily., Disp: 90 tablet, Rfl: 1    gabapentin 300 MG Oral Cap, Take 3 capsules (900 mg total) by mouth every morning., Disp: 270 capsule, Rfl: 1    omeprazole 20 MG Oral Capsule Delayed Release, Take 1 capsule (20 mg total) by mouth daily., Disp: , Rfl:     Probiotic Product (PROBIOTIC OR), Take 1 capsule by mouth daily., Disp: , Rfl:     Calcium Carbonate (CALCIUM 600 OR), Take 1 tablet by mouth daily., Disp: , Rfl:     Cholecalciferol (VITAMIN D) 50 MCG (2000 UT) Oral Cap, Take 1 capsule (2,000 Units total) by mouth daily. Pt is now taking 10,000 units, Disp: , Rfl:     Biotin 48537 MCG Oral Tab, Take 1 tablet by mouth daily., Disp: , Rfl:     Calcium Carbonate-Vitamin D (CALCIUM-D) 600-400 MG-UNIT Oral Tab,  Take 1 tablet by mouth daily., Disp: , Rfl:     JOLESSA 0.15-0.03 MG Oral Tab, 1 tablet daily., Disp: , Rfl:   PRN:     Allergies:  No Known Allergies       EXAM:  /82   Pulse 81   Resp 14   Ht 66\"   LMP 12/23/2023   SpO2 97%   BMI 33.89 kg/m²   Looks stated age  General Exam:  HENT:  pink conjunctiva anicteric sclerae  Neck no adenopathy, thyroid normal  Heart and Lungs:  normal  Extremities: no cyanosis, skin changes    NEURO  Alert oriented fluent speech  CN normal  Motor no drift  DTRs are hyporeflexic  Gait within normal  No spasms      INTERPRETATION of RELEVANT LABS and other DATA:    MRI reviewed with the patient - minimal lesion burden    EMG in 2018        Problem/s Identified this visit:   1. Multiple sclerosis (HCC)    2. Sensory neuropathy    3. Thoracic radiculopathy          Discussion plus Diagnostics & Treatment Orders:  There is no lesion correlate in the spinal cord to explain the thoracic tightness however, \"false\"  sensory levels have been described with cervical level lesions and therefore I proposed doing Solu-Medrol pharmacologic trial to see whether this chest tightness will get better.  She also has more diffuse symptoms including fatigue and brain fog and we will then also see whether this will improve with Solu-Medrol infusion.      (x) Discussed potential side effects of any treatment relevant to above.  Includes explanation of tests as necessary.    Return for scheduled EMG-NCV test.  I would like to verify and validate the previously abnormal EMG noted suggesting neuropathy because if it is indeed present, this deserves a different line of workup    Also agree discontinuing the gabapentin because \"it has not helped\" plus she had always been wondering why she was gaining weight and I told her this could be a big factor.  Likewise she used to take gabapentin all 900 mg in the morning which could also be a factor why she gets so tired.  I suggested perhaps starting off with  taking 1 in the morning and 2 at night first and eventually perhaps weaning herself off it and see what happens.    Patient understands that if needed, based on condition and or test results, follow up will be readjusted      Bubba Smith MD  Vascular & General Neurology  Director, Multiple Sclerosis Program  [facility]  9/11/2024, Time completed 9:31 AM    Decision making:  ( x ) labs reviewed/ordered - 1  (  ) new diagnosis: - 1  ( x) Images & studies independently reviewed -non F2F  (  ) Case/studies discussed with other caregivers - -non F2F  (  ) Telephone time with patiern or authorized Fam member--non F2F  ( x ) other records reviewed --non F2F including consultations  (  ) [facility] meetings - patient not present --non F2F  (  ) Independent Historian obtained    Non Face to Face CPT code 03217/44848 applies as documented above    PROCEDURE DONE     (   ) see notes        After visit, patient was escorted out and handed-off discharge process and instructions to the check out desk.  No additional issues relevant to visit were raised to staff at this time interval.        This document is to be interpreted as my current opinion regarding the case as of the stated date of service based on the information available to me at this time and may supersedes any prior opinion expressed either orally or in writing.  Services rendered are only within the scope of direct medical care  Sometimes, reports may have been prepared partially using a speech recognition software technology.  If a word or phrase is confusing or out of context, please do not hesitate to call for clarification.

## 2024-09-11 NOTE — PATIENT INSTRUCTIONS
Refill policies:    Allow 2-3 business days for refills; controlled substances may take longer.  Contact your pharmacy at least 5 days prior to running out of medication and have them send an electronic request or submit request through the “request refill” option in your [employer] account.  Refills are not addressed on weekends; covering physicians do not authorize routine medications on weekends.  No narcotics or controlled substances are refilled after noon on Fridays or by on call physicians.  By law, narcotics must be electronically prescribed.  A 30 day supply with no refills is the maximum allowed.  If your prescription is due for a refill, you may be due for a follow up appointment.  To best provide you care, patients receiving routine medications need to be seen at least once a year.  Patients receiving narcotic/controlled substance medications need to be seen at least once every 3 months.  In the event that your preferred pharmacy does not have the requested medication in stock (e.g. Backordered), it is your responsibility to find another pharmacy that has the requested medication available.  We will gladly send a new prescription to that pharmacy at your request.    Scheduling Tests:    If your physician has ordered radiology tests such as MRI or CT scans, please contact Central Scheduling at [phone] right away to schedule the test.  Once scheduled, the [facility] Centralized Referral Team will work with your insurance carrier to obtain pre-certification or prior authorization.  Depending on your insurance carrier, approval may take 3-10 days.  It is highly recommended patients assure they have received an authorization before having a test performed.  If test is done without insurance authorization, patient may be responsible for the entire amount billed.      Precertification and Prior Authorizations:  If your physician has recommended that you have a procedure or additional testing performed the [facility]  Centralized Referral Team will contact your insurance carrier to obtain pre-certification or prior authorization.    You are strongly encouraged to contact your insurance carrier to verify that your procedure/test has been approved and is a COVERED benefit.  Although the [facility] Centralized Referral Team does its due diligence, the insurance carrier gives the disclaimer that \"Although the procedure is authorized, this does not guarantee payment.\"    Ultimately the patient is responsible for payment.   Thank you for your understanding in this matter.  Paperwork Completion:  If you require FMLA or disability paperwork for your recovery, please make sure to either drop it off or have it faxed to our office at [phone]. Be sure the form has your name and date of birth on it.  The form will be faxed to our Forms Department and they will complete it for you.  There is a 25$ fee for all forms that need to be filled out.  Please be aware there is a 10-14 day turnaround time.  You will need to sign a release of information (JULIANNA) form if your paperwork does not come with one.  You may call the Forms Department with any questions at [phone].  Their fax number is [phone].

## 2024-09-11 NOTE — TELEPHONE ENCOUNTER
Per Dr. Smith \"Schedule her for 3 days Solumedrol infusion 500 mg daily followed by oral taper \"      Spoke with patient. She verbalized being able to do solumedrol at [place] office. Also per patient she will have infusion upon return of Provider.     PA submitted

## 2024-11-12 ENCOUNTER — PROCEDURE VISIT (OUTPATIENT)
Dept: NEUROLOGY | Facility: CLINIC | Age: 44
End: 2024-11-12
Payer: COMMERCIAL

## 2024-11-12 DIAGNOSIS — R20.9 DISTURBANCE OF SKIN SENSATION: Primary | ICD-10-CM

## 2024-11-12 PROCEDURE — 95909 NRV CNDJ TST 5-6 STUDIES: CPT | Performed by: OTHER

## 2024-11-12 PROCEDURE — 95886 MUSC TEST DONE W/N TEST COMP: CPT | Performed by: OTHER

## 2024-11-12 NOTE — PATIENT INSTRUCTIONS
Refill policies:    Allow 2-3 business days for refills; controlled substances may take longer.  Contact your pharmacy at least 5 days prior to running out of medication and have them send an electronic request or submit request through the “request refill” option in your Embedded Chat account.  Refills are not addressed on weekends; covering physicians do not authorize routine medications on weekends.  No narcotics or controlled substances are refilled after noon on Fridays or by on call physicians.  By law, narcotics must be electronically prescribed.  A 30 day supply with no refills is the maximum allowed.  If your prescription is due for a refill, you may be due for a follow up appointment.  To best provide you care, patients receiving routine medications need to be seen at least once a year.  Patients receiving narcotic/controlled substance medications need to be seen at least once every 3 months.  In the event that your preferred pharmacy does not have the requested medication in stock (e.g. Backordered), it is your responsibility to find another pharmacy that has the requested medication available.  We will gladly send a new prescription to that pharmacy at your request.    Scheduling Tests:    If your physician has ordered radiology tests such as MRI or CT scans, please contact Central Scheduling at [phone] right away to schedule the test.  Once scheduled, the [facility] Centralized Referral Team will work with your insurance carrier to obtain pre-certification or prior authorization.  Depending on your insurance carrier, approval may take 3-10 days.  It is highly recommended patients assure they have received an authorization before having a test performed.  If test is done without insurance authorization, patient may be responsible for the entire amount billed.      Precertification and Prior Authorizations:  If your physician has recommended that you have a procedure or additional testing performed the [facility]  Centralized Referral Team will contact your insurance carrier to obtain pre-certification or prior authorization.    You are strongly encouraged to contact your insurance carrier to verify that your procedure/test has been approved and is a COVERED benefit.  Although the [facility] Centralized Referral Team does its due diligence, the insurance carrier gives the disclaimer that \"Although the procedure is authorized, this does not guarantee payment.\"    Ultimately the patient is responsible for payment.   Thank you for your understanding in this matter.  Paperwork Completion:  If you require FMLA or disability paperwork for your recovery, please make sure to either drop it off or have it faxed to our office at [phone]. Be sure the form has your name and date of birth on it.  The form will be faxed to our Forms Department and they will complete it for you.  There is a 25$ fee for all forms that need to be filled out.  Please be aware there is a 10-14 day turnaround time.  You will need to sign a release of information (JULIANNA) form if your paperwork does not come with one.  You may call the Forms Department with any questions at [phone].  Their fax number is [phone].

## 2024-11-12 NOTE — PROGRESS NOTES
[facility]  [address] [place] [address]   [phone]        Full Name: Ruthann Cage Gender: Female  Patient ID: [id number] YOB: 1980      Visit Date: 11/12/2024 2:12 PM  Age: 44 Years  Examining Physician: Bubba Smith  Height: 5 feet 6 inch  Weight: 210 lbs  History: Multiple sclerosis  Sensory neuropathy.  Follow up study and comparison with prior test      Sensory NCS      Nerve / Sites Rec. Site Onset Lat Peak Lat NP Amp PP Amp Segments Distance Velocity Comment     ms ms µV µV  cm m/s    R Sural - (Antidromic)      Calf Ankle NR NR NR NR Calf - Ankle 14 NR    R Superficial peroneal - (Antidromic)      Lat leg Ankle NR NR NR NR Lat leg - Ankle 14 NR        Motor NCS      Nerve / Sites Muscle Latency Amplitude Segments Dist. Lat Diff Velocity Comments     ms mV  cm ms m/s    R Peroneal - EDB      Ankle EDB 4.27 2.2 Ankle - EDB 8         B. Fib Head EDB 12.13 1.8 B. Fib Head - Ankle 35 7.85 44.6    L Peroneal - EDB      Ankle EDB 4.65 3.0 Ankle - EDB 8         B. Fib Head EDB 12.42 2.5 B. Fib Head - Ankle 32 7.77 41.2    R Tibial - AH      Ankle AH 3.96 4.5 Ankle - AH 8         Knee AH 13.10 0.8 Knee - Ankle 39 9.15 42.6    L Tibial - AH      Ankle AH 4.23 6.3 Ankle - AH 8         Knee AH 12.10 3.2 Knee - Ankle 37 7.88 47.0        F  Wave      Nerve M Latency F Latency F-M Lat    ms ms ms   R Tibial - AH 4.5 54.3 49.8       Right leg  Muscle Ins Pos Fib Fasc Recruit MUP   Quads N 0 0 0 Full N   Ant Tib N 0 0 0 Full N   Gastroc M N 0 0 0 Full N   Per Long N 0 0 0 Full N   EHL N 0 0 0 Full N       Ruthann Cage [id number] 11/12/2024 2:12 PM     2 of 3  RESULTS:  EMG of the right leg was unremarkable  Absent sural and superficial sensory responses on the right leg  Tibial and Peroneal motor conductions showed normal latencies amplitudes and velocities    Conclusion:   Compared to prior testing, the same findings are noted.  Findings are  consistent with predominantly sensory neuropathy lower extremities        Bubba Cage [id number] 11/12/2024 2:12 PM     2 of 3

## 2024-12-27 DIAGNOSIS — G35 MULTIPLE SCLEROSIS (HCC): ICD-10-CM

## 2024-12-27 RX ORDER — TERIFLUNOMIDE 7 MG/1
7 TABLET, FILM COATED ORAL DAILY
Qty: 90 TABLET | Refills: 3 | Status: SHIPPED | OUTPATIENT
Start: 2024-12-27

## 2024-12-27 NOTE — TELEPHONE ENCOUNTER
Ruthann just filled her last refill of Teriflunomide. She will need a refill sent to the [facility] pharmacy.

## 2024-12-27 NOTE — TELEPHONE ENCOUNTER
Medication: Teriflunomide 7 mg     Date of last refill: 09/11/2024  [place]      Last office visit: 11/20/2024  Due back to clinic per last office note:    Date next office visit scheduled:    Future Appointments   Date Time Provider Department Center   5/13/2025  9:00 AM [facility]           Last OV note recommendation:    RESULTS:  EMG of the right leg was unremarkable  Absent sural and superficial sensory responses on the right leg  Tibial and Peroneal motor conductions showed normal latencies amplitudes and velocities     Conclusion:   Compared to prior testing, the same findings are noted.  Findings are consistent with predominantly sensory neuropathy lower extremities

## 2025-03-03 ENCOUNTER — TELEPHONE (OUTPATIENT)
Dept: NEUROLOGY | Facility: CLINIC | Age: 45
End: 2025-03-03

## 2025-03-03 DIAGNOSIS — G35 MULTIPLE SCLEROSIS (HCC): ICD-10-CM

## 2025-03-03 DIAGNOSIS — G35 MULTIPLE SCLEROSIS (HCC): Primary | ICD-10-CM

## 2025-03-03 RX ORDER — TERIFLUNOMIDE 14 MG/1
14 TABLET, FILM COATED ORAL DAILY
Qty: 90 TABLET | Refills: 3 | Status: SHIPPED | OUTPATIENT
Start: 2025-03-03 | End: 2025-06-01

## 2025-03-03 NOTE — TELEPHONE ENCOUNTER
Patient to infuse SOLUMEDROL 500 mg IV for three days at the [place] office.  Will follow with oral taper PREDNISONE, has been ordered to local pharmacy.

## 2025-03-03 NOTE — TELEPHONE ENCOUNTER
Per Dr Smith, patient to infuse SOLUMEDROL 500 mg IV for three days in [place] office followed by oral taper.    We will change back to 14 mg TERIFLUNOMIDE. Ordered to [facility].    Imaging ordered, patient requests [facility]:    MRI Brain  MRI Cervical Spine  MRI Thoracic Spine    Patient informed, verbalized understanding.  Patient to call [facility] to schedule, given number.

## 2025-03-03 NOTE — TELEPHONE ENCOUNTER
Can increase to 14 mg and do SOlumedrol followed by oral taper  Get new set of MRI brain and spinal cord   No prior thoracic lesion to explain chest pains    Dr. TANYA Smith

## 2025-03-04 ENCOUNTER — NURSE ONLY (OUTPATIENT)
Dept: NEUROLOGY | Facility: CLINIC | Age: 45
End: 2025-03-04
Payer: COMMERCIAL

## 2025-03-04 VITALS — DIASTOLIC BLOOD PRESSURE: 78 MMHG | HEART RATE: 86 BPM | SYSTOLIC BLOOD PRESSURE: 128 MMHG

## 2025-03-04 DIAGNOSIS — G35 MULTIPLE SCLEROSIS (HCC): Primary | ICD-10-CM

## 2025-03-04 PROCEDURE — 96365 THER/PROPH/DIAG IV INF INIT: CPT | Performed by: OTHER

## 2025-03-04 PROCEDURE — 3074F SYST BP LT 130 MM HG: CPT | Performed by: OTHER

## 2025-03-04 PROCEDURE — 3078F DIAST BP <80 MM HG: CPT | Performed by: OTHER

## 2025-03-04 RX ORDER — PREDNISONE 20 MG/1
TABLET ORAL
Qty: 30 TABLET | Refills: 0 | Status: SHIPPED | OUTPATIENT
Start: 2025-03-07 | End: 2025-03-22

## 2025-03-04 RX ORDER — METHYLPREDNISOLONE SODIUM SUCCINATE 500 MG/8ML
500 INJECTION INTRAMUSCULAR; INTRAVENOUS DAILY
Status: SHIPPED | OUTPATIENT
Start: 2025-03-04 | End: 2025-03-07

## 2025-03-04 RX ADMIN — METHYLPREDNISOLONE SODIUM SUCCINATE 500 MG: 500 INJECTION INTRAMUSCULAR; INTRAVENOUS at 15:17:00

## 2025-03-04 NOTE — TELEPHONE ENCOUNTER
Prior authorization request completed for:  solumedrol   Authorization #no auth needed   Pre-D: no  CPT codes approved: ,93851  Physician: marlys  Location: office  Call Ref#: [id number]  Representative Name: clare  Insurance Carrier: bc [phone]

## 2025-03-04 NOTE — TELEPHONE ENCOUNTER
Informed Ruthann that IV Solumedrol has been approved by her insurance.  Patient requests to infuse today at 3pm.   notified.

## 2025-03-04 NOTE — PROGRESS NOTES
IV started per CRISTOPHER Zhao and Solu-Medrol infused over 20 minutes without difficulty. IV discontinued per RN. Patient tolerated infusion well with no complications.    Patient reports numbness/tingling on the left side of her left leg.

## 2025-03-05 ENCOUNTER — NURSE ONLY (OUTPATIENT)
Dept: NEUROLOGY | Facility: CLINIC | Age: 45
End: 2025-03-05
Payer: COMMERCIAL

## 2025-03-05 VITALS — SYSTOLIC BLOOD PRESSURE: 138 MMHG | HEART RATE: 80 BPM | DIASTOLIC BLOOD PRESSURE: 78 MMHG

## 2025-03-05 DIAGNOSIS — G35 MULTIPLE SCLEROSIS (HCC): Primary | ICD-10-CM

## 2025-03-05 PROCEDURE — 3078F DIAST BP <80 MM HG: CPT | Performed by: OTHER

## 2025-03-05 PROCEDURE — 3075F SYST BP GE 130 - 139MM HG: CPT | Performed by: OTHER

## 2025-03-05 PROCEDURE — 96365 THER/PROPH/DIAG IV INF INIT: CPT | Performed by: OTHER

## 2025-03-05 PROCEDURE — 96372 THER/PROPH/DIAG INJ SC/IM: CPT | Performed by: OTHER

## 2025-03-05 RX ORDER — KETOROLAC TROMETHAMINE 30 MG/ML
30 INJECTION, SOLUTION INTRAMUSCULAR; INTRAVENOUS ONCE
Status: COMPLETED | OUTPATIENT
Start: 2025-03-05 | End: 2025-03-05

## 2025-03-05 RX ADMIN — METHYLPREDNISOLONE SODIUM SUCCINATE 500 MG: 500 INJECTION INTRAMUSCULAR; INTRAVENOUS at 09:33:00

## 2025-03-05 RX ADMIN — KETOROLAC TROMETHAMINE 30 MG: 30 INJECTION, SOLUTION INTRAMUSCULAR; INTRAVENOUS at 10:15:00

## 2025-03-05 NOTE — PROGRESS NOTES
IV started per CRISTOPHER Zhao and Solu-Medrol infused over 25 minutes without difficulty. IV discontinued per RN. Patient tolerated infusion well with no complications.    Patient reports having a headache in her temporal area of her head.  She reports the headache started around 6 pm last night and has progressively gotten worse.  She denies nausea, vomiting, light sensitivity, sound sensitivity, or vision changes.  She reports taking  mg at 9 pm last night but that did not help.    Per Dr. Smith, the infusion rate should be given at half rate today and tomorrow.  Additionally, patient should receive a Toradol injection today for the headache pain.  Patient is instructed to push fluids today and monitor her symptoms over the next 24 hours.  She is instructed to not take NSAIDs for her headache as she was given Toradol however Tylenol is fine.  She expresses full understanding.

## 2025-03-06 ENCOUNTER — NURSE ONLY (OUTPATIENT)
Dept: NEUROLOGY | Facility: CLINIC | Age: 45
End: 2025-03-06
Payer: COMMERCIAL

## 2025-03-06 VITALS — SYSTOLIC BLOOD PRESSURE: 132 MMHG | DIASTOLIC BLOOD PRESSURE: 84 MMHG | HEART RATE: 86 BPM

## 2025-03-06 NOTE — PROGRESS NOTES
Patient presented to the office today for her third Solumedrol infusion and stated that she continued to have a bad headache in the temporal region.  She reported that her headache did not subside after the Toradol injection yesterday and got slightly worse.  Per Dr. Smith, patient should not have the third Solumedrol infusion today and should start oral prednisone taper today.  Patient instructed to call the office should her headache worsen or not subside by the weekend.  She verbalizes understanding and is agreeable to the plan.

## 2025-04-14 ENCOUNTER — HOSPITAL ENCOUNTER (EMERGENCY)
Age: 45
Discharge: HOME | End: 2025-04-14
Attending: EMERGENCY MEDICINE

## 2025-04-14 ENCOUNTER — APPOINTMENT (OUTPATIENT)
Dept: ULTRASOUND IMAGING | Age: 45
End: 2025-04-14
Attending: EMERGENCY MEDICINE

## 2025-04-14 ENCOUNTER — APPOINTMENT (OUTPATIENT)
Dept: CT IMAGING | Age: 45
End: 2025-04-14
Attending: EMERGENCY MEDICINE

## 2025-04-14 VITALS
RESPIRATION RATE: 18 BRPM | BODY MASS INDEX: 35.01 KG/M2 | SYSTOLIC BLOOD PRESSURE: 119 MMHG | DIASTOLIC BLOOD PRESSURE: 81 MMHG | HEART RATE: 82 BPM | HEIGHT: 67 IN | OXYGEN SATURATION: 99 % | TEMPERATURE: 98.1 F

## 2025-04-14 DIAGNOSIS — M79.605 PAIN OF LEFT LOWER EXTREMITY: ICD-10-CM

## 2025-04-14 DIAGNOSIS — R10.9 LEFT-SIDED ABDOMINAL PAIN OF UNKNOWN ETIOLOGY: Primary | ICD-10-CM

## 2025-04-14 LAB
ALBUMIN SERPL-MCNC: 2.8 G/DL (ref 3.4–5)
ALBUMIN/GLOB SERPL: 0.7 {RATIO} (ref 1–2.4)
ALP SERPL-CCNC: 69 UNITS/L (ref 45–117)
ALT SERPL-CCNC: 15 UNITS/L
ANION GAP SERPL CALC-SCNC: 9 MMOL/L (ref 7–19)
APPEARANCE UR: CLEAR
AST SERPL-CCNC: 15 UNITS/L
ATRIAL RATE (BPM): 83
BACTERIA #/AREA URNS HPF: ABNORMAL /HPF
BASOPHILS # BLD: 0.1 K/MCL (ref 0–0.3)
BASOPHILS NFR BLD: 1 %
BILIRUB SERPL-MCNC: 0.4 MG/DL (ref 0.2–1)
BILIRUB UR QL STRIP: NEGATIVE
BUN SERPL-MCNC: 5 MG/DL (ref 6–20)
BUN/CREAT SERPL: 8 (ref 7–25)
CALCIUM SERPL-MCNC: 8.3 MG/DL (ref 8.4–10.2)
CHLORIDE SERPL-SCNC: 109 MMOL/L (ref 97–110)
CO2 SERPL-SCNC: 25 MMOL/L (ref 21–32)
COLOR UR: ABNORMAL
CREAT SERPL-MCNC: 0.63 MG/DL (ref 0.51–0.95)
D DIMER PPP FEU-MCNC: 1.58 MG/L (FEU)
DEPRECATED RDW RBC: 47.2 FL (ref 39–50)
EGFRCR SERPLBLD CKD-EPI 2021: >90 ML/MIN/{1.73_M2}
EOSINOPHIL # BLD: 0.1 K/MCL (ref 0–0.5)
EOSINOPHIL NFR BLD: 1 %
ERYTHROCYTE [DISTWIDTH] IN BLOOD: 14 % (ref 11–15)
FASTING DURATION TIME PATIENT: ABNORMAL H
GLOBULIN SER-MCNC: 3.8 G/DL (ref 2–4)
GLUCOSE SERPL-MCNC: 94 MG/DL (ref 70–99)
GLUCOSE UR STRIP-MCNC: NEGATIVE MG/DL
HCG UR QL: NEGATIVE
HCT VFR BLD CALC: 38.5 % (ref 36–46.5)
HGB BLD-MCNC: 12.4 G/DL (ref 12–15.5)
HGB UR QL STRIP: ABNORMAL
HYALINE CASTS #/AREA URNS LPF: ABNORMAL /LPF
IMM GRANULOCYTES # BLD AUTO: 0 K/MCL (ref 0–0.2)
IMM GRANULOCYTES # BLD: 0 %
KETONES UR STRIP-MCNC: NEGATIVE MG/DL
LEUKOCYTE ESTERASE UR QL STRIP: NEGATIVE
LYMPHOCYTES # BLD: 1.6 K/MCL (ref 1–4.8)
LYMPHOCYTES NFR BLD: 26 %
MAGNESIUM SERPL-MCNC: 1.9 MG/DL (ref 1.7–2.4)
MCH RBC QN AUTO: 29.3 PG (ref 26–34)
MCHC RBC AUTO-ENTMCNC: 32.2 G/DL (ref 32–36.5)
MCV RBC AUTO: 91 FL (ref 78–100)
MONOCYTES # BLD: 0.6 K/MCL (ref 0.3–0.9)
MONOCYTES NFR BLD: 10 %
MUCOUS THREADS URNS QL MICRO: PRESENT
NEUTROPHILS # BLD: 3.9 K/MCL (ref 1.8–7.7)
NEUTROPHILS NFR BLD: 62 %
NITRITE UR QL STRIP: NEGATIVE
NRBC BLD MANUAL-RTO: 0 /100 WBC
P AXIS (DEGREES): 44
PH UR STRIP: 6 [PH] (ref 5–7)
PLATELET # BLD AUTO: 354 K/MCL (ref 140–450)
POTASSIUM SERPL-SCNC: 3.6 MMOL/L (ref 3.4–5.1)
PR-INTERVAL (MSEC): 150
PROT SERPL-MCNC: 6.6 G/DL (ref 6.4–8.2)
PROT UR STRIP-MCNC: NEGATIVE MG/DL
QRS-INTERVAL (MSEC): 80
QT-INTERVAL (MSEC): 370
QTC: 435
R AXIS (DEGREES): 47
RAINBOW EXTRA TUBES HOLD SPECIMEN: NORMAL
RBC # BLD: 4.23 MIL/MCL (ref 4–5.2)
RBC #/AREA URNS HPF: ABNORMAL /HPF
REPORT TEXT: NORMAL
SODIUM SERPL-SCNC: 139 MMOL/L (ref 135–145)
SP GR UR STRIP: 1.01 (ref 1–1.03)
SQUAMOUS #/AREA URNS HPF: ABNORMAL /HPF
T AXIS (DEGREES): 41
UROBILINOGEN UR STRIP-MCNC: 0.2 MG/DL
VENTRICULAR RATE EKG/MIN (BPM): 83
WBC # BLD: 6.3 K/MCL (ref 4.2–11)
WBC #/AREA URNS HPF: ABNORMAL /HPF

## 2025-04-14 PROCEDURE — 36415 COLL VENOUS BLD VENIPUNCTURE: CPT | Performed by: EMERGENCY MEDICINE

## 2025-04-14 PROCEDURE — 99285 EMERGENCY DEPT VISIT HI MDM: CPT

## 2025-04-14 PROCEDURE — 85379 FIBRIN DEGRADATION QUANT: CPT | Performed by: EMERGENCY MEDICINE

## 2025-04-14 PROCEDURE — 81001 URINALYSIS AUTO W/SCOPE: CPT | Performed by: EMERGENCY MEDICINE

## 2025-04-14 PROCEDURE — 85025 COMPLETE CBC W/AUTO DIFF WBC: CPT | Performed by: EMERGENCY MEDICINE

## 2025-04-14 PROCEDURE — 70450 CT HEAD/BRAIN W/O DYE: CPT

## 2025-04-14 PROCEDURE — 93971 EXTREMITY STUDY: CPT

## 2025-04-14 PROCEDURE — 80053 COMPREHEN METABOLIC PANEL: CPT | Performed by: EMERGENCY MEDICINE

## 2025-04-14 PROCEDURE — 93010 ELECTROCARDIOGRAM REPORT: CPT | Performed by: INTERNAL MEDICINE

## 2025-04-14 PROCEDURE — 93005 ELECTROCARDIOGRAM TRACING: CPT | Performed by: EMERGENCY MEDICINE

## 2025-04-14 PROCEDURE — 84703 CHORIONIC GONADOTROPIN ASSAY: CPT

## 2025-04-14 PROCEDURE — 83735 ASSAY OF MAGNESIUM: CPT | Performed by: EMERGENCY MEDICINE

## 2025-04-14 PROCEDURE — 74176 CT ABD & PELVIS W/O CONTRAST: CPT

## 2025-05-13 ENCOUNTER — OFFICE VISIT (OUTPATIENT)
Dept: NEUROLOGY | Facility: CLINIC | Age: 45
End: 2025-05-13
Payer: COMMERCIAL

## 2025-05-13 VITALS
RESPIRATION RATE: 16 BRPM | SYSTOLIC BLOOD PRESSURE: 128 MMHG | BODY MASS INDEX: 37.49 KG/M2 | DIASTOLIC BLOOD PRESSURE: 77 MMHG | HEART RATE: 87 BPM | HEIGHT: 65 IN | WEIGHT: 225 LBS

## 2025-05-13 DIAGNOSIS — M54.14 THORACIC RADICULOPATHY: ICD-10-CM

## 2025-05-13 DIAGNOSIS — N39.41 URGENCY INCONTINENCE: ICD-10-CM

## 2025-05-13 DIAGNOSIS — R20.9 DISTURBANCE OF SKIN SENSATION: ICD-10-CM

## 2025-05-13 DIAGNOSIS — G35 MULTIPLE SCLEROSIS (HCC): Primary | ICD-10-CM

## 2025-05-13 PROCEDURE — 3008F BODY MASS INDEX DOCD: CPT | Performed by: OTHER

## 2025-05-13 PROCEDURE — 99214 OFFICE O/P EST MOD 30 MIN: CPT | Performed by: OTHER

## 2025-05-13 PROCEDURE — 3078F DIAST BP <80 MM HG: CPT | Performed by: OTHER

## 2025-05-13 PROCEDURE — 3074F SYST BP LT 130 MM HG: CPT | Performed by: OTHER

## 2025-05-13 NOTE — PROGRESS NOTES
NEUROLOGY  [facility]       Ruthann Cage  9/19/1980  Primary Care Provider:  Chery Sánchez MD    5/13/2025  44 year old yo,  was last seen on:: Sept    Seen for/plans last visit:  MS    Previous visit and existing record notes reviewed in preparation for the face to face visit.  Relevant labs and studies reviewed and will be noted in relevant areas of this record.  Accompanied visit:     (x) No.      Present condition:  In march developed this stiff pain left torso    Steroid infusion helped with tiredness and the stiffness pain in the left flank got better in the background BUT now is back    Besides the pain in the Torso, the left side of the lower leg was painful    During the infusion, the next day she gets severe headaches..,  Despite slowing, the HA still was there and skipped the 3rd day and went on the oral medications    APril ===> ER visit because the pain in the left leg increased along with left flank pain and did not find anything    At night anything tight makes it uncomfortable and cannot stay still  It feels numb and feels like something is stuck and got stabbed    The left flank is stiff  She stopped the Gabapentin in September because it was used for \"neuropathy\"    Past History update/new problem(s): as above    Review of Systems:  Review of Systems:  Denies systemic symptoms.  She describes urine urgency and sometimes dribbling of uring     No CP or SOB.  No GI or  symptoms. Relevant Neuro as noted above.      Medications:    Medications - Current[1]  PRN: PRN Medications[2]    Allergies:  Allergies[3]       EXAM:  /77 (BP Location: Right arm, Patient Position: Sitting, Cuff Size: large)   Pulse 87   Resp 16   Ht 65\"   Wt 225 lb (102.1 kg)   LMP 03/24/2025 (Approximate)   BMI 37.44 kg/m²   Looks stated age  General Exam:  HENT:  pink conjunctiva anicteric sclerae  Neck no adenopathy, thyroid normal  Heart and Lungs:  normal  Extremities: no cyanosis, skin  changes    NEURO  NEURO  Able to relate events with fluent speech and intact comprehension  CN 2-12: pupils reactive, VF full face symmetric sensation and movement tongue midline  No motor focal findings  Sensory: no lateralizing findings  Reflexes are symmetric but reduced    UMN signs: none  Gait: narrow based          INTERPRETATION of RELEVANT LABS and other DATA:    MRI brain  FINDINGS:     Several T2/FLAIR hyperintense signal foci mostly in the periventricular white matter and the corpus callosum are stable in size. There are no new lesions. The largest remains just above the trigone of the left lateral ventricle and it measures up to 15   mm greatest dimension which is stable from prior examination.. This is best seen on image #98, series 15. Previously referenced next largest measures 5 mm in the mid body of the left corpus callosum best seen on image #70, series 16. The rest remain   sub-5 mm in size. None of these T2 hyperintense signal enhances on the postcontrast T1 axial and coronal sequences. There continues to be no abnormal signal intensity in the brainstem and the cerebellar hemispheres.         THORACIC MRI March - no cord lesion  CERVICAL  FINDINGS:     Ill-defined T2/STIR hyperintense signal in the right dorsal aspect of the cervical cord at the level of C2 vertebrae is stable and is best seen on image #10, series 9 as well as image #9, series 13. This overall measurement is approximately 8 x 3 x 2 mm   in CC, AP, and transverse dimensions. There is no evidence of a new signal abnormality in the cord. On the postcontrast T1 sagittal and axial sequences, this signal abnormality does not enhance.               Problem/s Identified this visit:   1. Multiple sclerosis (HCC)    2. Disturbance of skin sensation    3. Thoracic radiculopathy    4. Urgency incontinence          Discussion plus Diagnostics & Treatment Orders:  Get Biomarkers and if elevated then another round of steroid slower  infusion    I dont feel these are active symptoms but effect of the lesions  Go back on Gabapentin 300 mg BID. Update with response    Discussed TEriflunomide whether this is still the safest to use    If bladder urgency continues we can start VESICARE    (x) Discussed potential side effects of any treatment relevant to above.  Includes explanation of tests as necessary.    Return in about 4 months (around 9/13/2025) for Follow up with Mariza.      Patient understands that if needed, based on condition and or test results, follow up will be readjusted      Bubba Smith MD  Vascular & General Neurology  Director, Multiple Sclerosis Program  [facility]  5/13/2025, Time completed 9:27 AM    Decision making:  ( x ) labs reviewed/ordered - 1  (  ) new diagnosis: - 1  ( x) Images & studies independently reviewed -non F2F  (  ) Case/studies discussed with other caregivers - -non F2F  (  ) Telephone time with patiern or authorized Fam member--non F2F  ( x ) other records reviewed --non F2F including consultations  (  ) [facility] meetings - patient not present --non F2F  (  ) Independent Historian obtained    Non Face to Face CPT code 60113/42831 applies as documented above    PROCEDURE DONE     (   ) see notes        After visit, patient was escorted out and handed-off discharge process and instructions to the check out desk.  No additional issues relevant to visit were raised to staff at this time interval.        This document is to be interpreted as my current opinion regarding the case as of the stated date of service based on the information available to me at this time and may supersedes any prior opinion expressed either orally or in writing.  Services rendered are only within the scope of direct medical care  Sometimes, reports may have been prepared partially using a speech recognition software technology.  If a word or phrase is confusing or out of context, please do not hesitate to call for  clarification.              [1]   Current Outpatient Medications:     Teriflunomide 14 MG Oral Tab, Take 14 mg by mouth daily., Disp: 90 tablet, Rfl: 3    Teriflunomide (AUBAGIO) 7 MG Oral Tab, Take 7 mg by mouth daily., Disp: 90 tablet, Rfl: 3    amLODIPine 2.5 MG Oral Tab, Take 1 tablet (2.5 mg total) by mouth daily., Disp: , Rfl:     gabapentin 300 MG Oral Cap, Take 3 capsules (900 mg total) by mouth every morning., Disp: 270 capsule, Rfl: 1    omeprazole 20 MG Oral Capsule Delayed Release, Take 1 capsule (20 mg total) by mouth daily., Disp: , Rfl:     Probiotic Product (PROBIOTIC OR), Take 1 capsule by mouth daily., Disp: , Rfl:     Calcium Carbonate (CALCIUM 600 OR), Take 1 tablet by mouth daily., Disp: , Rfl:     Cholecalciferol (VITAMIN D) 50 MCG (2000 UT) Oral Cap, Take 1 capsule (2,000 Units total) by mouth daily. Pt is now taking 10,000 units, Disp: , Rfl:     Biotin 32600 MCG Oral Tab, Take 1 tablet by mouth daily., Disp: , Rfl:     Calcium Carbonate-Vitamin D (CALCIUM-D) 600-400 MG-UNIT Oral Tab, Take 1 tablet by mouth daily., Disp: , Rfl:     JOLESSA 0.15-0.03 MG Oral Tab, 1 tablet daily., Disp: , Rfl:   [2] [3] No Known Allergies

## 2025-05-13 NOTE — PATIENT INSTRUCTIONS
Refill policies:    Allow 2-3 business days for refills; controlled substances may take longer.  Contact your pharmacy at least 5 days prior to running out of medication and have them send an electronic request or submit request through the “request refill” option in your [employer] account.  Refills are not addressed on weekends; covering physicians do not authorize routine medications on weekends.  No narcotics or controlled substances are refilled after noon on Fridays or by on call physicians.  By law, narcotics must be electronically prescribed.  A 30 day supply with no refills is the maximum allowed.  If your prescription is due for a refill, you may be due for a follow up appointment.  To best provide you care, patients receiving routine medications need to be seen at least once a year.  Patients receiving narcotic/controlled substance medications need to be seen at least once every 3 months.  In the event that your preferred pharmacy does not have the requested medication in stock (e.g. Backordered), it is your responsibility to find another pharmacy that has the requested medication available.  We will gladly send a new prescription to that pharmacy at your request.    Scheduling Tests:    If your physician has ordered radiology tests such as MRI or CT scans, please contact Central Scheduling at [phone] right away to schedule the test.  Once scheduled, the [facility] Centralized Referral Team will work with your insurance carrier to obtain pre-certification or prior authorization.  Depending on your insurance carrier, approval may take 3-10 days.  It is highly recommended patients assure they have received an authorization before having a test performed.  If test is done without insurance authorization, patient may be responsible for the entire amount billed.      Precertification and Prior Authorizations:  If your physician has recommended that you have a procedure or additional testing performed the [facility]  Centralized Referral Team will contact your insurance carrier to obtain pre-certification or prior authorization.    You are strongly encouraged to contact your insurance carrier to verify that your procedure/test has been approved and is a COVERED benefit.  Although the [facility] Centralized Referral Team does its due diligence, the insurance carrier gives the disclaimer that \"Although the procedure is authorized, this does not guarantee payment.\"    Ultimately the patient is responsible for payment.   Thank you for your understanding in this matter.  Paperwork Completion:  If you require FMLA or disability paperwork for your recovery, please make sure to either drop it off or have it faxed to our office at [phone]. Be sure the form has your name and date of birth on it.  The form will be faxed to our Forms Department and they will complete it for you.  There is a 25$ fee for all forms that need to be filled out.  Please be aware there is a 10-14 day turnaround time.  You will need to sign a release of information (JULIANNA) form if your paperwork does not come with one.  You may call the Forms Department with any questions at [phone].  Their fax number is [phone].

## 2025-05-22 ENCOUNTER — TELEPHONE (OUTPATIENT)
Dept: NEUROLOGY | Facility: CLINIC | Age: 45
End: 2025-05-22

## 2025-05-22 RX ORDER — PREDNISONE 20 MG/1
TABLET ORAL
Qty: 30 TABLET | Refills: 0 | Status: SHIPPED | OUTPATIENT
Start: 2025-05-22 | End: 2025-06-06

## 2025-09-03 ENCOUNTER — TELEPHONE (OUTPATIENT)
Dept: OBGYN | Age: 45
End: 2025-09-03

## 2025-09-03 RX ORDER — LEVONORGESTREL AND ETHINYL ESTRADIOL 0.15-0.03
1 KIT ORAL DAILY
Qty: 91 TABLET | Refills: 0 | Status: SHIPPED | OUTPATIENT
Start: 2025-09-03

## 2025-09-03 RX ORDER — LEVONORGESTREL AND ETHINYL ESTRADIOL 0.15-0.03
1 KIT ORAL DAILY
Qty: 91 TABLET | Refills: 4 | OUTPATIENT
Start: 2025-09-03

## 2025-10-07 ENCOUNTER — APPOINTMENT (OUTPATIENT)
Dept: OBGYN | Age: 45
End: 2025-10-07

## (undated) DIAGNOSIS — G35 MULTIPLE SCLEROSIS EXACERBATION (HCC): Primary | ICD-10-CM

## (undated) DIAGNOSIS — G35 MULTIPLE SCLEROSIS (HCC): ICD-10-CM

## (undated) NOTE — LETTER
07/30/18      Toshia Meza      Dear Raheel Del Rosario,     We are contacting you from Dr. Roxane Call office. Your health is important to us. We have not received results for tests that your provider recommended at your visit.      Horace

## (undated) NOTE — MR AVS SNAPSHOT
[address], [address] [address]               Thank you for choosing us for your health care visit with Katia Walter MD.  We are glad to serve you and happy to provide you with this summary To schedule an appointment for your radiology test please call [facility] Scheduling at [phone].        Friday June 23, 2017     Imaging:  MRI CERVICAL+THORACIC SPINE (ALL W+WO) (CPT=72156/34961)    Instructions:  IMPORTANT    Your physician has or ? Written prescriptions must be picked up in office. ? Please allow the office 2-3 business days to fill the prescription. ? Patient must present photo ID at time of .     ? Written prescriptions picked up on Fridays must be picked up between the Commonly known as:  Amye Sable 0.15-0.03 MG Tabs   Generic drug:  Levonorgest-Eth Estrad 91-Day   1 tablet daily. PROBIOTIC DAILY OR   Take by mouth daily. Vitamin D 2000 units Caps   Take 1 capsule by mouth daily. You don’t need to join a gym. Home exercises work great.  Put more priority on exercise in your life                    Visit [facility] online at  [email or web address]

## (undated) NOTE — LETTER
Date: 8/31/2017    Patient Name: Toshia Jaeger          To Whom it may concern: The above patient is being seen at the [facility] for treatment of a medical condition. The patient's medication is in the process of being adjusted.  Due to

## (undated) NOTE — LETTER
Patient Name: Estrella Mayes        : 1980       Medical Record #: [id number]    CONSENT FOR PROCEDURES/SEDATION    Date: 1/3/2018       Time: 11:05 AM        1.  I authorize the performance upon Estrella Mayes the following:    Vitamin b12 injection

## (undated) NOTE — LETTER
07/09/19    Dear Maximilian Ramesh,    We are contacting you from Dr. Fabrice Chaparro office. Your health is important to us.   Therefore, we are sending this friendly reminder that you have the following overdue labs and/or tests which were ordered at your last office vis

## (undated) NOTE — LETTER
11/15/18        Thuy Meza      Dear Donald Banks,     We are contacting you from Dr. Lionel Anderson office. Your health is important to us.  We have not received test results for additional tests that your provider recommended at y